# Patient Record
Sex: MALE | Race: WHITE | NOT HISPANIC OR LATINO | URBAN - METROPOLITAN AREA
[De-identification: names, ages, dates, MRNs, and addresses within clinical notes are randomized per-mention and may not be internally consistent; named-entity substitution may affect disease eponyms.]

---

## 2017-11-23 ENCOUNTER — EMERGENCY (EMERGENCY)
Facility: HOSPITAL | Age: 82
LOS: 1 days | Discharge: HOME | End: 2017-11-23
Attending: EMERGENCY MEDICINE | Admitting: EMERGENCY MEDICINE
Payer: MEDICARE

## 2017-11-23 VITALS
RESPIRATION RATE: 18 BRPM | OXYGEN SATURATION: 95 % | WEIGHT: 154.98 LBS | TEMPERATURE: 99 F | SYSTOLIC BLOOD PRESSURE: 115 MMHG | DIASTOLIC BLOOD PRESSURE: 78 MMHG | HEART RATE: 102 BPM

## 2017-11-23 DIAGNOSIS — R50.9 FEVER, UNSPECIFIED: ICD-10-CM

## 2017-11-23 DIAGNOSIS — I10 ESSENTIAL (PRIMARY) HYPERTENSION: ICD-10-CM

## 2017-11-23 PROCEDURE — 87040 BLOOD CULTURE FOR BACTERIA: CPT

## 2017-11-23 PROCEDURE — 99284 EMERGENCY DEPT VISIT MOD MDM: CPT | Mod: 25

## 2017-11-23 PROCEDURE — 87633 RESP VIRUS 12-25 TARGETS: CPT

## 2017-11-23 PROCEDURE — 71020: CPT | Mod: 26

## 2017-11-23 PROCEDURE — 71046 X-RAY EXAM CHEST 2 VIEWS: CPT

## 2017-11-23 PROCEDURE — 87798 DETECT AGENT NOS DNA AMP: CPT

## 2017-11-23 PROCEDURE — 87581 M.PNEUMON DNA AMP PROBE: CPT

## 2017-11-23 PROCEDURE — 85730 THROMBOPLASTIN TIME PARTIAL: CPT

## 2017-11-23 PROCEDURE — 83605 ASSAY OF LACTIC ACID: CPT

## 2017-11-23 PROCEDURE — 85025 COMPLETE CBC W/AUTO DIFF WBC: CPT

## 2017-11-23 PROCEDURE — 80053 COMPREHEN METABOLIC PANEL: CPT

## 2017-11-23 PROCEDURE — 36415 COLL VENOUS BLD VENIPUNCTURE: CPT

## 2017-11-23 PROCEDURE — 87486 CHLMYD PNEUM DNA AMP PROBE: CPT

## 2017-11-23 PROCEDURE — 96374 THER/PROPH/DIAG INJ IV PUSH: CPT

## 2017-11-23 PROCEDURE — 85610 PROTHROMBIN TIME: CPT

## 2017-11-23 RX ORDER — CEFTRIAXONE 500 MG/1
1 INJECTION, POWDER, FOR SOLUTION INTRAMUSCULAR; INTRAVENOUS ONCE
Qty: 0 | Refills: 0 | Status: DISCONTINUED | OUTPATIENT
Start: 2017-11-23 | End: 2017-11-23

## 2017-11-23 RX ORDER — CEFTRIAXONE 500 MG/1
1 INJECTION, POWDER, FOR SOLUTION INTRAMUSCULAR; INTRAVENOUS ONCE
Qty: 0 | Refills: 0 | Status: COMPLETED | OUTPATIENT
Start: 2017-11-23 | End: 2017-11-23

## 2017-11-23 RX ORDER — SODIUM CHLORIDE 9 MG/ML
500 INJECTION INTRAMUSCULAR; INTRAVENOUS; SUBCUTANEOUS ONCE
Qty: 0 | Refills: 0 | Status: COMPLETED | OUTPATIENT
Start: 2017-11-23 | End: 2017-11-23

## 2017-11-23 RX ADMIN — SODIUM CHLORIDE 250 MILLILITER(S): 9 INJECTION INTRAMUSCULAR; INTRAVENOUS; SUBCUTANEOUS at 23:16

## 2017-11-23 RX ADMIN — CEFTRIAXONE 100 GRAM(S): 500 INJECTION, POWDER, FOR SOLUTION INTRAMUSCULAR; INTRAVENOUS at 23:48

## 2017-11-23 NOTE — ED ADULT NURSE NOTE - OBJECTIVE STATEMENT
84 y/o male with hx of HTN arrived to [facility] ER reporting fever since this morning accompanied with slight weakness. Pt verbalized that he was experiencing chills and had an oral temperature of 100.9 F then proceeded to take Tylenol at 7pm. Upon assessment, abdomen soft, lung fields WNL, breathing is equal and unlabored, pulses palpable, no visible injuries noted. Pt denies dysuria, chest pain, headache, dizziness, blurred vision, slurred speech, nausea, vomiting, diarrhea, fever, chills, LOC, SOB, recent travel, recent injury, and palpitations. care in progress. Awaiting disposition

## 2017-11-23 NOTE — ED PROVIDER NOTE - OBJECTIVE STATEMENT
83 yom pw fever, chills, fatigue.  denies cough, no sob, no abd pain, no vomiting, no leg pain, no rash, no HA, no neck pain.  no known sick contact.  sx today.  took 2 tylenol at 7pm.

## 2017-11-23 NOTE — ED PROVIDER NOTE - DIAGNOSTIC INTERPRETATION
ER Physician: Swapnil Romero  CHEST XRAY INTERPRETATION: lungs clear, heart shadow normal, bony structures intact

## 2017-11-24 VITALS
RESPIRATION RATE: 18 BRPM | SYSTOLIC BLOOD PRESSURE: 124 MMHG | DIASTOLIC BLOOD PRESSURE: 80 MMHG | HEART RATE: 88 BPM | TEMPERATURE: 98 F | OXYGEN SATURATION: 95 %

## 2017-11-24 LAB — RAPID RVP RESULT: SIGNIFICANT CHANGE UP

## 2017-11-24 NOTE — ED ADULT NURSE REASSESSMENT NOTE - NS ED NURSE REASSESS COMMENT FT1
Pt has a steady gait walking with dr. Romero. PT respectfully declined the urine sample and getting a straight cath for urine. Pt tried to urinate with assistance and cannot pee. PT. will send urine off to his medical doctor tomorrow. Dr Romero is aware,.

## 2018-06-07 ENCOUNTER — EMERGENCY (EMERGENCY)
Facility: HOSPITAL | Age: 83
LOS: 1 days | Discharge: HOME | End: 2018-06-07
Attending: EMERGENCY MEDICINE | Admitting: EMERGENCY MEDICINE
Payer: MEDICARE

## 2018-06-07 VITALS
DIASTOLIC BLOOD PRESSURE: 80 MMHG | OXYGEN SATURATION: 96 % | TEMPERATURE: 97 F | SYSTOLIC BLOOD PRESSURE: 129 MMHG | RESPIRATION RATE: 18 BRPM | HEART RATE: 82 BPM

## 2018-06-07 LAB
APTT BLD: 27.9 SEC — SIGNIFICANT CHANGE UP (ref 27.5–37.4)
BASOPHILS NFR BLD AUTO: 0.3 % — SIGNIFICANT CHANGE UP (ref 0–2)
EOSINOPHIL NFR BLD AUTO: 1.9 % — SIGNIFICANT CHANGE UP (ref 0–6)
HCT VFR BLD CALC: 34.1 % — LOW (ref 39–50)
HGB BLD-MCNC: 11.9 G/DL — LOW (ref 13–17)
INR BLD: 1.08 — SIGNIFICANT CHANGE UP (ref 0.88–1.16)
LYMPHOCYTES # BLD AUTO: 34.1 % — SIGNIFICANT CHANGE UP (ref 13–44)
MCHC RBC-ENTMCNC: 29.7 PG — SIGNIFICANT CHANGE UP (ref 27–34)
MCHC RBC-ENTMCNC: 34.9 G/DL — SIGNIFICANT CHANGE UP (ref 32–36)
MCV RBC AUTO: 85 FL — SIGNIFICANT CHANGE UP (ref 80–100)
MONOCYTES NFR BLD AUTO: 5.9 % — SIGNIFICANT CHANGE UP (ref 2–14)
NEUTROPHILS NFR BLD AUTO: 57.8 % — SIGNIFICANT CHANGE UP (ref 43–77)
PLATELET # BLD AUTO: 187 K/UL — SIGNIFICANT CHANGE UP (ref 150–400)
PROTHROM AB SERPL-ACNC: 12 SEC — SIGNIFICANT CHANGE UP (ref 9.8–12.7)
RBC # BLD: 4.01 M/UL — LOW (ref 4.2–5.8)
RBC # FLD: 12.9 % — SIGNIFICANT CHANGE UP (ref 10.3–16.9)
WBC # BLD: 5.7 K/UL — SIGNIFICANT CHANGE UP (ref 3.8–10.5)
WBC # FLD AUTO: 5.7 K/UL — SIGNIFICANT CHANGE UP (ref 3.8–10.5)

## 2018-06-07 PROCEDURE — 99284 EMERGENCY DEPT VISIT MOD MDM: CPT

## 2018-06-07 RX ORDER — LEVETIRACETAM 250 MG/1
1000 TABLET, FILM COATED ORAL ONCE
Qty: 0 | Refills: 0 | Status: COMPLETED | OUTPATIENT
Start: 2018-06-07 | End: 2018-06-07

## 2018-06-07 RX ADMIN — LEVETIRACETAM 400 MILLIGRAM(S): 250 TABLET, FILM COATED ORAL at 23:41

## 2018-06-07 NOTE — ED ADULT NURSE NOTE - OBJECTIVE STATEMENT
pt BIBA , pt has a history of seizures did not  take his 10 pm seizure medications , pt was at a restaurant and started having seizure  like activity, no LOC , no injuries noted , Iv was accessed lab sent will continue to monitor

## 2018-06-07 NOTE — ED ADULT TRIAGE NOTE - ARRIVAL INFO ADDITIONAL COMMENTS
pt had a witnessed seizure at a restaurant with family.  arrives with amnesia to event.  no urinary inc.  oriented but slow.

## 2018-06-08 ENCOUNTER — INPATIENT (INPATIENT)
Facility: HOSPITAL | Age: 83
LOS: 4 days | Discharge: NURSING, REHAB OR LONG-TERM CARE | DRG: 64 | End: 2018-06-13
Attending: NEUROLOGICAL SURGERY | Admitting: NEUROLOGICAL SURGERY
Payer: MEDICARE

## 2018-06-08 VITALS
SYSTOLIC BLOOD PRESSURE: 133 MMHG | RESPIRATION RATE: 18 BRPM | DIASTOLIC BLOOD PRESSURE: 71 MMHG | HEART RATE: 97 BPM | OXYGEN SATURATION: 95 %

## 2018-06-08 VITALS
OXYGEN SATURATION: 98 % | RESPIRATION RATE: 18 BRPM | HEART RATE: 83 BPM | DIASTOLIC BLOOD PRESSURE: 88 MMHG | SYSTOLIC BLOOD PRESSURE: 146 MMHG

## 2018-06-08 DIAGNOSIS — I62.9 NONTRAUMATIC INTRACRANIAL HEMORRHAGE, UNSPECIFIED: ICD-10-CM

## 2018-06-08 PROBLEM — I10 ESSENTIAL (PRIMARY) HYPERTENSION: Chronic | Status: ACTIVE | Noted: 2017-11-23

## 2018-06-08 LAB
ALBUMIN SERPL ELPH-MCNC: 4.2 G/DL — SIGNIFICANT CHANGE UP (ref 3.3–5)
ALP SERPL-CCNC: 90 U/L — SIGNIFICANT CHANGE UP (ref 40–120)
ALT FLD-CCNC: 26 U/L — SIGNIFICANT CHANGE UP (ref 10–45)
ANION GAP SERPL CALC-SCNC: 14 MMOL/L — SIGNIFICANT CHANGE UP (ref 5–17)
APPEARANCE UR: CLEAR — SIGNIFICANT CHANGE UP
AST SERPL-CCNC: 29 U/L — SIGNIFICANT CHANGE UP (ref 10–40)
BILIRUB SERPL-MCNC: 0.4 MG/DL — SIGNIFICANT CHANGE UP (ref 0.2–1.2)
BILIRUB UR-MCNC: NEGATIVE — SIGNIFICANT CHANGE UP
BLD GP AB SCN SERPL QL: NEGATIVE — SIGNIFICANT CHANGE UP
BLD GP AB SCN SERPL QL: NEGATIVE — SIGNIFICANT CHANGE UP
BUN SERPL-MCNC: 17 MG/DL — SIGNIFICANT CHANGE UP (ref 7–23)
CALCIUM SERPL-MCNC: 9.1 MG/DL — SIGNIFICANT CHANGE UP (ref 8.4–10.5)
CHLORIDE SERPL-SCNC: 100 MMOL/L — SIGNIFICANT CHANGE UP (ref 96–108)
CO2 SERPL-SCNC: 23 MMOL/L — SIGNIFICANT CHANGE UP (ref 22–31)
COLOR SPEC: YELLOW — SIGNIFICANT CHANGE UP
CREAT SERPL-MCNC: 0.84 MG/DL — SIGNIFICANT CHANGE UP (ref 0.5–1.3)
DIFF PNL FLD: NEGATIVE — SIGNIFICANT CHANGE UP
GLUCOSE SERPL-MCNC: 109 MG/DL — HIGH (ref 70–99)
GLUCOSE UR QL: NEGATIVE — SIGNIFICANT CHANGE UP
KETONES UR-MCNC: NEGATIVE — SIGNIFICANT CHANGE UP
LEUKOCYTE ESTERASE UR-ACNC: NEGATIVE — SIGNIFICANT CHANGE UP
NITRITE UR-MCNC: NEGATIVE — SIGNIFICANT CHANGE UP
PCP SPEC-MCNC: SIGNIFICANT CHANGE UP
PH UR: 6 — SIGNIFICANT CHANGE UP (ref 5–8)
POTASSIUM SERPL-MCNC: 4.4 MMOL/L — SIGNIFICANT CHANGE UP (ref 3.5–5.3)
POTASSIUM SERPL-SCNC: 4.4 MMOL/L — SIGNIFICANT CHANGE UP (ref 3.5–5.3)
PROT SERPL-MCNC: 6.6 G/DL — SIGNIFICANT CHANGE UP (ref 6–8.3)
PROT UR-MCNC: NEGATIVE MG/DL — SIGNIFICANT CHANGE UP
RH IG SCN BLD-IMP: POSITIVE — SIGNIFICANT CHANGE UP
RH IG SCN BLD-IMP: POSITIVE — SIGNIFICANT CHANGE UP
SODIUM SERPL-SCNC: 137 MMOL/L — SIGNIFICANT CHANGE UP (ref 135–145)
SP GR SPEC: 1.01 — SIGNIFICANT CHANGE UP (ref 1–1.03)
UROBILINOGEN FLD QL: 0.2 E.U./DL — SIGNIFICANT CHANGE UP

## 2018-06-08 PROCEDURE — 72100 X-RAY EXAM L-S SPINE 2/3 VWS: CPT | Mod: 26

## 2018-06-08 PROCEDURE — 93306 TTE W/DOPPLER COMPLETE: CPT | Mod: 26

## 2018-06-08 PROCEDURE — 74177 CT ABD & PELVIS W/CONTRAST: CPT | Mod: 26

## 2018-06-08 PROCEDURE — 99291 CRITICAL CARE FIRST HOUR: CPT

## 2018-06-08 PROCEDURE — 70553 MRI BRAIN STEM W/O & W/DYE: CPT | Mod: 26

## 2018-06-08 PROCEDURE — 70496 CT ANGIOGRAPHY HEAD: CPT | Mod: 26

## 2018-06-08 PROCEDURE — 93010 ELECTROCARDIOGRAM REPORT: CPT

## 2018-06-08 PROCEDURE — 71260 CT THORAX DX C+: CPT | Mod: 26

## 2018-06-08 PROCEDURE — 70450 CT HEAD/BRAIN W/O DYE: CPT | Mod: 26,59

## 2018-06-08 PROCEDURE — 95951: CPT | Mod: 26,52

## 2018-06-08 PROCEDURE — 71045 X-RAY EXAM CHEST 1 VIEW: CPT | Mod: 26

## 2018-06-08 PROCEDURE — 99223 1ST HOSP IP/OBS HIGH 75: CPT

## 2018-06-08 RX ORDER — SODIUM CHLORIDE 9 MG/ML
1000 INJECTION INTRAMUSCULAR; INTRAVENOUS; SUBCUTANEOUS
Qty: 0 | Refills: 0 | Status: DISCONTINUED | OUTPATIENT
Start: 2018-06-08 | End: 2018-06-09

## 2018-06-08 RX ORDER — DESMOPRESSIN ACETATE 0.1 MG/1
20 TABLET ORAL ONCE
Qty: 0 | Refills: 0 | Status: COMPLETED | OUTPATIENT
Start: 2018-06-08 | End: 2018-06-08

## 2018-06-08 RX ORDER — DIATRIZOATE MEGLUMINE 180 MG/ML
30 INJECTION, SOLUTION INTRAVESICAL ONCE
Qty: 0 | Refills: 0 | Status: COMPLETED | OUTPATIENT
Start: 2018-06-08 | End: 2018-06-08

## 2018-06-08 RX ORDER — DOCUSATE SODIUM 100 MG
100 CAPSULE ORAL THREE TIMES A DAY
Qty: 0 | Refills: 0 | Status: DISCONTINUED | OUTPATIENT
Start: 2018-06-08 | End: 2018-06-13

## 2018-06-08 RX ORDER — LEVETIRACETAM 250 MG/1
1000 TABLET, FILM COATED ORAL
Qty: 0 | Refills: 0 | Status: DISCONTINUED | OUTPATIENT
Start: 2018-06-09 | End: 2018-06-13

## 2018-06-08 RX ORDER — LISINOPRIL 2.5 MG/1
20 TABLET ORAL DAILY
Qty: 0 | Refills: 0 | Status: DISCONTINUED | OUTPATIENT
Start: 2018-06-08 | End: 2018-06-13

## 2018-06-08 RX ORDER — ACETAMINOPHEN 500 MG
650 TABLET ORAL ONCE
Qty: 0 | Refills: 0 | Status: COMPLETED | OUTPATIENT
Start: 2018-06-08 | End: 2018-06-08

## 2018-06-08 RX ORDER — TAMSULOSIN HYDROCHLORIDE 0.4 MG/1
0.4 CAPSULE ORAL AT BEDTIME
Qty: 0 | Refills: 0 | Status: DISCONTINUED | OUTPATIENT
Start: 2018-06-08 | End: 2018-06-13

## 2018-06-08 RX ORDER — OXYCODONE AND ACETAMINOPHEN 5; 325 MG/1; MG/1
1 TABLET ORAL EVERY 4 HOURS
Qty: 0 | Refills: 0 | Status: DISCONTINUED | OUTPATIENT
Start: 2018-06-08 | End: 2018-06-09

## 2018-06-08 RX ORDER — PANTOPRAZOLE SODIUM 20 MG/1
40 TABLET, DELAYED RELEASE ORAL DAILY
Qty: 0 | Refills: 0 | Status: DISCONTINUED | OUTPATIENT
Start: 2018-06-08 | End: 2018-06-10

## 2018-06-08 RX ORDER — LUTEIN 20 MG
1 CAPSULE ORAL
Qty: 0 | Refills: 0 | COMMUNITY

## 2018-06-08 RX ORDER — LEVETIRACETAM 250 MG/1
1000 TABLET, FILM COATED ORAL EVERY 12 HOURS
Qty: 0 | Refills: 0 | Status: DISCONTINUED | OUTPATIENT
Start: 2018-06-08 | End: 2018-06-08

## 2018-06-08 RX ORDER — SODIUM CHLORIDE 9 MG/ML
1000 INJECTION INTRAMUSCULAR; INTRAVENOUS; SUBCUTANEOUS
Qty: 0 | Refills: 0 | Status: DISCONTINUED | OUTPATIENT
Start: 2018-06-08 | End: 2018-06-08

## 2018-06-08 RX ORDER — GLUCOSAMINE HCL/CHONDROITIN SU 500-400 MG
0 CAPSULE ORAL
Qty: 0 | Refills: 0 | COMMUNITY

## 2018-06-08 RX ORDER — LEVETIRACETAM 250 MG/1
1500 TABLET, FILM COATED ORAL
Qty: 0 | Refills: 0 | Status: DISCONTINUED | OUTPATIENT
Start: 2018-06-08 | End: 2018-06-13

## 2018-06-08 RX ORDER — ONDANSETRON 8 MG/1
4 TABLET, FILM COATED ORAL EVERY 6 HOURS
Qty: 0 | Refills: 0 | Status: DISCONTINUED | OUTPATIENT
Start: 2018-06-08 | End: 2018-06-13

## 2018-06-08 RX ORDER — OXYCODONE AND ACETAMINOPHEN 5; 325 MG/1; MG/1
2 TABLET ORAL EVERY 4 HOURS
Qty: 0 | Refills: 0 | Status: DISCONTINUED | OUTPATIENT
Start: 2018-06-08 | End: 2018-06-09

## 2018-06-08 RX ORDER — ACETAMINOPHEN 500 MG
1000 TABLET ORAL ONCE
Qty: 0 | Refills: 0 | Status: COMPLETED | OUTPATIENT
Start: 2018-06-08 | End: 2018-06-08

## 2018-06-08 RX ORDER — SIMVASTATIN 20 MG/1
20 TABLET, FILM COATED ORAL AT BEDTIME
Qty: 0 | Refills: 0 | Status: DISCONTINUED | OUTPATIENT
Start: 2018-06-08 | End: 2018-06-12

## 2018-06-08 RX ORDER — ACETAMINOPHEN 500 MG
650 TABLET ORAL EVERY 6 HOURS
Qty: 0 | Refills: 0 | Status: DISCONTINUED | OUTPATIENT
Start: 2018-06-08 | End: 2018-06-09

## 2018-06-08 RX ORDER — SENNA PLUS 8.6 MG/1
2 TABLET ORAL AT BEDTIME
Qty: 0 | Refills: 0 | Status: DISCONTINUED | OUTPATIENT
Start: 2018-06-08 | End: 2018-06-13

## 2018-06-08 RX ORDER — LEVETIRACETAM 250 MG/1
1000 TABLET, FILM COATED ORAL ONCE
Qty: 0 | Refills: 0 | Status: COMPLETED | OUTPATIENT
Start: 2018-06-08 | End: 2018-06-08

## 2018-06-08 RX ADMIN — SODIUM CHLORIDE 75 MILLILITER(S): 9 INJECTION INTRAMUSCULAR; INTRAVENOUS; SUBCUTANEOUS at 06:55

## 2018-06-08 RX ADMIN — Medication 650 MILLIGRAM(S): at 10:29

## 2018-06-08 RX ADMIN — LEVETIRACETAM 1500 MILLIGRAM(S): 250 TABLET, FILM COATED ORAL at 18:07

## 2018-06-08 RX ADMIN — OXYCODONE AND ACETAMINOPHEN 1 TABLET(S): 5; 325 TABLET ORAL at 18:36

## 2018-06-08 RX ADMIN — Medication 1000 MILLIGRAM(S): at 12:44

## 2018-06-08 RX ADMIN — DIATRIZOATE MEGLUMINE 30 MILLILITER(S): 180 INJECTION, SOLUTION INTRAVESICAL at 11:54

## 2018-06-08 RX ADMIN — SIMVASTATIN 20 MILLIGRAM(S): 20 TABLET, FILM COATED ORAL at 21:34

## 2018-06-08 RX ADMIN — Medication 650 MILLIGRAM(S): at 09:59

## 2018-06-08 RX ADMIN — Medication 400 MILLIGRAM(S): at 12:14

## 2018-06-08 RX ADMIN — Medication 100 MILLIGRAM(S): at 14:53

## 2018-06-08 RX ADMIN — Medication 1 TABLET(S): at 14:53

## 2018-06-08 RX ADMIN — DESMOPRESSIN ACETATE 220 MICROGRAM(S): 0.1 TABLET ORAL at 04:39

## 2018-06-08 RX ADMIN — Medication 650 MILLIGRAM(S): at 02:34

## 2018-06-08 RX ADMIN — OXYCODONE AND ACETAMINOPHEN 1 TABLET(S): 5; 325 TABLET ORAL at 22:54

## 2018-06-08 RX ADMIN — OXYCODONE AND ACETAMINOPHEN 1 TABLET(S): 5; 325 TABLET ORAL at 22:02

## 2018-06-08 RX ADMIN — LISINOPRIL 20 MILLIGRAM(S): 2.5 TABLET ORAL at 06:55

## 2018-06-08 RX ADMIN — PANTOPRAZOLE SODIUM 40 MILLIGRAM(S): 20 TABLET, DELAYED RELEASE ORAL at 14:52

## 2018-06-08 RX ADMIN — SODIUM CHLORIDE 50 MILLILITER(S): 9 INJECTION INTRAMUSCULAR; INTRAVENOUS; SUBCUTANEOUS at 21:34

## 2018-06-08 RX ADMIN — TAMSULOSIN HYDROCHLORIDE 0.4 MILLIGRAM(S): 0.4 CAPSULE ORAL at 21:34

## 2018-06-08 RX ADMIN — LEVETIRACETAM 400 MILLIGRAM(S): 250 TABLET, FILM COATED ORAL at 06:56

## 2018-06-08 RX ADMIN — Medication 100 MILLIGRAM(S): at 06:55

## 2018-06-08 RX ADMIN — Medication 100 MILLIGRAM(S): at 21:34

## 2018-06-08 RX ADMIN — OXYCODONE AND ACETAMINOPHEN 1 TABLET(S): 5; 325 TABLET ORAL at 18:06

## 2018-06-08 NOTE — ED PROVIDER NOTE - PROGRESS NOTE DETAILS
+intracranial hemorrhage, no significant mass effect.  neurosurgery consulted NSG - recommend 1U platelets transfusion and DDAVP as pt is on aspirin.  recommend CT angio.  will admit to their service

## 2018-06-08 NOTE — H&P ADULT - PROBLEM SELECTOR PLAN 1
- Admit to neurosurgery ICU bed   - CTA head  - Repeat CTH 6 hours after 1st CT (8am)   - DDAVP 20mcg to reverse ASA 81   - 1 unit platelets   - BP    - Cont. Keppra - 1g in am, 1500mg in pm   - EEG after CTA   - Inc HOB 30 deg   - D/w Dr. Perrin

## 2018-06-08 NOTE — ED ADULT NURSE NOTE - CHPI ED SYMPTOMS NEG
no blurred vision/no vomiting/no loss of consciousness/no nausea/no numbness/no confusion/no weakness/no fever

## 2018-06-08 NOTE — ED PROVIDER NOTE - PROGRESS NOTE DETAILS
feeling well, acting like himself per family, staedy gait.  spoke with Dr. Watson.  recommend PMD and neuro f/u.   I have discussed the discharge plan with the patient. The patient agrees with the plan, as discussed.  The patient understands Emergency Department diagnosis is a preliminary diagnosis often based on limited information and that the patient must adhere to the follow-up plan as discussed.  The patient understands that if the symptoms worsen  the patient may return to the Emergency Department at any time for further evaluation and treatment.

## 2018-06-08 NOTE — ED PROVIDER NOTE - MEDICAL DECISION MAKING DETAILS
s/p second seizure tonight, compliant with medication, no focal neuro deficits  -check ct head, call epilepsy service

## 2018-06-08 NOTE — ED ADULT NURSE NOTE - OBJECTIVE STATEMENT
The patient is a 85 y/o male who came in to ED for evaluation s/p seizure today, after being discharged from  ED. As per wife pt has never have two seizures in one day. Pt was here in ED earlier for seizure episode and was medicated with Keppra 1g and then discharged. Pt urinated on himself.

## 2018-06-08 NOTE — PROGRESS NOTE ADULT - ASSESSMENT
Hypertension well controlled  Hemorrhagic stroke with subsequent seizure  Back pain r/o compression Fx    Plan neuro management per Alanis Perrin and Cam.  Would get xray or CT spine to r/o fx.

## 2018-06-08 NOTE — PROGRESS NOTE ADULT - ASSESSMENT
85 yo M w/ known h/o of L frontal cavernous malformation presents w/ seizures while on Keppra.  Now w/ backpain--may be from prostate CA vs injury from seizures.     transfer to SDU  d/w Dr. Levy and nsg   work up back pain  no need for urgency of CT chest/abd/pelvis w/ contrast--recommend cancelling  PT/OT eval  cont Keppra home dose  neuro checks q 4

## 2018-06-08 NOTE — H&P ADULT - NSHPLABSRESULTS_GEN_ALL_CORE
EXAM:  CT BRAIN                        PROCEDURE DATE:  06/08/2018    IMPRESSION:   Intraparenchymal hemorrhage within the left frontal subcortical white   matter measures up to approximately 2 cm. No midline shift or significant   mass effect. Underlying neoplasm is not excluded. Follow up with contrast   enhanced MRI is advised.

## 2018-06-08 NOTE — ED PROVIDER NOTE - OBJECTIVE STATEMENT
84M hx htn, seizures, brought back to ED after having seizure in cab ride home.  family states again whole body stiffened.  was shorter than last one.  pt was in ED earlier tonight after seizure during dinner.  given keppra 1000mg.  pt states he feels ok.  no numbness/weakness. no HA. no tongue biting.  no vomiting. 84M hx htn, seizures, brought back to ED after having seizure in cab ride home.  family states again whole body stiffened.  was shorter than last one.  pt was in ED earlier tonight after seizure during dinner.  given keppra 1000mg.  pt states he feels ok.  no numbness/weakness. no HA. no tongue biting.  no vomiting.  +incontinence.

## 2018-06-08 NOTE — ED PROVIDER NOTE - OBJECTIVE STATEMENT
84M hx seizures, htn, s/p seizure at dinner tonight.  per family pt was at dinner, suddenly became stiff and had shaking to body and limbs.  no fall, no head trauma.  states lasted 2-3 min.  no tongue biting, no incontinence.  was initially a little confused, however now feeling better.  states has had seizures in past after stroke, did not take evening keppra dose today.  no HA. no chest pain, no numbness/weakness.  no slurred speech.

## 2018-06-08 NOTE — H&P ADULT - NSHPPHYSICALEXAM_GEN_ALL_CORE
AOx3, NAD, calm, but slower and more fatigued than baseline per family   Face symmetric   PERRL, EOMI, visual fields intact   CN II-XII intact   Tongue midline   No pronator drift   MAEx4, strength 5/5 throughout  SILT   Unlabored breathing on RA   Reg cardiac rate   Abd soft, NT, ND

## 2018-06-08 NOTE — PROGRESS NOTE ADULT - SUBJECTIVE AND OBJECTIVE BOX
83 yo man with a history of hypertension for many years, prostate ca diagnosed in 2007,  and recurrent hemorrhagic strokes admitted after recurrence of seizures previously controlled on Keppra, and MRI evidence of new and old hemorrhage in left frontal lobe.  Pt reports he now feels well though is tired and has pain in his back which was likely hit when he fell in the midst of his recent seizure.  PE /70 mmHg  No fever Chest clear  Localized tenderness over spine

## 2018-06-08 NOTE — ED PROVIDER NOTE - MEDICAL DECISION MAKING DETAILS
s/p seizure tonight, no focal neuro deficits, no slurred speech, no facial asymmetry.  family and pt do not wish to have CT head at this time.  no head trauma.  -check labs, keppra s/p seizure tonight, no focal neuro deficits, no slurred speech, no facial asymmetry. pt aox3, at baseline per wife and son.  had discussion with pt and family, they do not wish to have CT head at this time though offered.  no head trauma.  -check labs, keppra

## 2018-06-08 NOTE — ED PROVIDER NOTE - CRITICAL CARE PROVIDED
documentation/consult w/ pt's family directly relating to pts condition/interpretation of diagnostic studies/direct patient care (not related to procedure)/consultation with other physicians/additional history taking

## 2018-06-08 NOTE — H&P ADULT - ATTENDING COMMENTS
Patient seen and examined by me. He presents with a small left frontal ICH. He has a history of multiple hemorrhage events in the left frontal region over the years. He has epilepsy generally presenting with speech arrest and confusion suggestive of focal onset in the area of brain hemorrhage. He has previously been worked up in [place] and by Dr. Levy for his epilepsy and recurrent brain hemorrhages. Per conversation with Dr. Levy, he likely has an underlying cavernous malformation. Unfortunately, no brain MRI is available to show such a lesion aside from ones obtained during acute hemorrhage events. MRI from Nov 2017 showed a similar "left frontal peripheral intra-axial hemorrhagic mass". EEG monitoring was placed on this admission and no seizures identified.     The possibility of underlying cavernous malformation discussed with family. Given the multiple symptomatic hemorrhage events and his medically intractable seizures, resection of the hemorrhagic mass offered as a reasonable strategy to both effect seizure control and to reduce the likelihood of repeat hemorrhage. The patient and primality his wife are not interested in pursuing operative intervention at this time. Patient would prefer conservative measures including AED adjustment if indicated. I will defer to neurologist Dr. Levy to adjust AEDs as he sees fit.    Of note, patient also presents with back pain and was found to have multiple areas of compression fracture in the thoracic and lumbar spine. Suspect osteoporotic in nature but will order MRI T- and L-spine as pathological fracture is in the differential diagnosis. The fractures are otherwise not destabilizing and can be managed conservatively pending MRI findings.    Will plan for followup brain and spine (thoracic and lumbar) in 1 to 2 months as an outpatient. Disposition plan home versus rehab pending PT/OT assessment.    Jeff Perrin M.D.  Neurosurgery

## 2018-06-08 NOTE — H&P ADULT - ASSESSMENT
84M hx stroke in 2013 with residual seizures, HTN, BPH presents with seizures x2, found to have left frontal IPH.

## 2018-06-08 NOTE — CONSULT NOTE ADULT - SUBJECTIVE AND OBJECTIVE BOX
CC: He had 2 more seizures  HPI: 84 M had "stroke" in 2012 present with aphasia and right limb weakness. L frontal ICH resolved without underlying neoplasm as per verbal report from family of follow-up in [place]. Seizure prompted start of Keppra that was subsequently increased when he had another seizure in 2013. He was stable on 1000mg qam, 1500qpm  when he had an episode of fever and confusion in November 2017 without specific metabolic cause in [facility] ER. On 11/28/17 L frontal ICH demonstrated on MRI while neurologically intact. At dinner last pm he became stiff and unresponsive in his seat and was brought to ER where seizure was explained away as missed Keppra dose. He then had another seizure in cab after leaving ER and returned. CT showed L frontal ICH with negative CTA.  ROS: thoraco-lumbar back pain since admission that increases on rolling over in bed  PE: alert, oriented, VFF, facial movements symmetric, minimal RUE pronator drift without weakness or slowing of JEMIMA, R extensor plantar response, sensation L=R, without dysmetria. Tender T12/L1?    A/P  1.Most likely cavernous malformation with seizures provoked by recurrent bleed.  2.Compression fracture due to forceful exension during tonic phase of seizure.     Discussed with NS team and with IM

## 2018-06-08 NOTE — PHYSICAL THERAPY INITIAL EVALUATION ADULT - PERTINENT HX OF CURRENT PROBLEM, REHAB EVAL
84 year old male had a witnessed seizure in the cab, was brought to ER for evaluation and keppra, no CTH performed at this time, then had repeat seizure episode after being discharged from ER. Patient was found to have left frontal IPH.

## 2018-06-08 NOTE — ED ADULT TRIAGE NOTE - ARRIVAL INFO ADDITIONAL COMMENTS
pt was seen in ER for seizure and discharged 30 mins ago.  while pt was getting in cab he had another seizure.  this time pt was inc of urine.

## 2018-06-08 NOTE — H&P ADULT - HISTORY OF PRESENT ILLNESS
84M hx HTN, stroke in 2013 with residual seizures, on ASA 81mg QD presents with witnessed seizures x2 this evening prior to taking pm dose of keppra, CTH reveals left frontal IPH, no MLS. Pt remembers both episodes, denies LOC, tongue biting, but did experience incontinence. He reports mild left sided back pain currently, but no headache, blurry vision, dizziness, weakness or numbness. Family reports "whole body stiffened" and body/limbs were shaking. He is currently being followed by a neurologist in [place] who changed his keppra dose, but also has seen Dr. Levy in [facility]. 84M hx HTN, BPH, stroke in 2013 with residual seizures, on ASA 81mg QD presents with witnessed seizures x2, CTH reveals left frontal IPH, no MLS. Per family, pt was on route home from dinner and had not yet taken his 10pm keppra. He had a witnessed seizure in the cab, was brought to ER for evaluation and keppra, no CTH performed at this time, then had repeat seizure episode after being discharged from ER. Pt remembers both episodes, denies LOC, tongue biting, but did experience incontinence. He reports mild left sided back pain currently, but no headache, blurry vision, dizziness, weakness or numbness. Family reports "whole body stiffened" and body/limbs were shaking. He is currently being followed by a neurologist in [place] who changed his keppra dose, but also has seen Dr. Levy in [facility]. ICH score 1, NIH score 0.

## 2018-06-08 NOTE — PROGRESS NOTE ADULT - SUBJECTIVE AND OBJECTIVE BOX
NEUROCRITICAL CARE PROGRESS NOTE    THOMAS ROBB   MRN-[id number]  Summary:  /  HPI:  84M hx HTN, BPH, stroke in 2013 with residual seizures, on ASA 81mg QD presents with witnessed seizures x2, CTH reveals left frontal IPH, no MLS. Per family, pt was on route home from dinner and had not yet taken his 10pm keppra. He had a witnessed seizure in the cab, was brought to ER for evaluation and keppra, no CTH performed at this time, then had repeat seizure episode after being discharged from ER. Pt remembers both episodes, denies LOC, tongue biting, but did experience incontinence. He reports mild left sided back pain currently, but no headache, blurry vision, dizziness, weakness or numbness. Family reports "whole body stiffened" and body/limbs were shaking. He is currently being followed by a neurologist in [place] who changed his keppra dose, but also has seen Dr. Levy in [facility]. ICH score 1, NIH score 0. (08 Jun 2018 04:27)      S/Overnight events:    POD#     EVD:    CSF :    ICPs:      Vital Signs Last 24 Hrs  T(C): 37.7 (08 Jun 2018 07:17), Max: 37.7 (08 Jun 2018 07:17)  T(F): 99.8 (08 Jun 2018 07:17), Max: 99.8 (08 Jun 2018 07:17)  HR: 86 (08 Jun 2018 06:02) (82 - 97)  BP: 132/73 (08 Jun 2018 06:02) (129/80 - 146/88)  BP(mean): 94 (08 Jun 2018 06:02) (93 - 94)  RR: 16 (08 Jun 2018 06:02) (16 - 20)  SpO2: 96% (08 Jun 2018 06:02) (94% - 98%)        I&O's Detail    07 Jun 2018 07:01  -  08 Jun 2018 07:00  --------------------------------------------------------  IN:    Platelets - Single Donor: 221 mL  Total IN: 221 mL    OUT:  Total OUT: 0 mL    Total NET: 221 mL      LABS:                        11.9   5.7   )-----------( 187      ( 07 Jun 2018 23:34 )             34.1     06-07    137  |  100  |  17  ----------------------------<  109<H>  4.4   |  23  |  0.84    Ca    9.1      07 Jun 2018 23:34    TPro  6.6  /  Alb  4.2  /  TBili  0.4  /  DBili  x   /  AST  29  /  ALT  26  /  AlkPhos  90  06-07    PT/INR - ( 07 Jun 2018 23:34 )   PT: 12.0 sec;   INR: 1.08          PTT - ( 07 Jun 2018 23:34 )  PTT:27.9 sec        CAPILLARY BLOOD GLUCOSE      POCT Blood Glucose.: 115 mg/dL (08 Jun 2018 01:19)      Drug Levels: [] N/A    CSF Analysis: [] N/A      Allergies    No Known Allergies    Intolerances      MEDICATIONS:  Antibiotics:    Neuro:  acetaminophen   Tablet 650 milliGRAM(s) Oral every 6 hours PRN  acetaminophen   Tablet. 650 milliGRAM(s) Oral every 6 hours PRN  levETIRAcetam 1500 milliGRAM(s) Oral <User Schedule>  ondansetron Injectable 4 milliGRAM(s) IV Push every 6 hours PRN    Anticoagulation:    OTHER:  docusate sodium 100 milliGRAM(s) Oral three times a day  lisinopril 20 milliGRAM(s) Oral daily  pantoprazole  Injectable 40 milliGRAM(s) IV Push daily  senna 2 Tablet(s) Oral at bedtime PRN  simvastatin 20 milliGRAM(s) Oral at bedtime  tamsulosin 0.4 milliGRAM(s) Oral at bedtime    IVF:  multivitamin 1 Tablet(s) Oral daily  sodium chloride 0.9%. 1000 milliLiter(s) IV Continuous <Continuous>    CULTURES:    RADIOLOGY & ADDITIONAL TESTS: NEUROCRITICAL CARE PROGRESS NOTE    THOMAS ROBB   MRN-[id number]  Summary:  /  HPI:  84M hx HTN, BPH, stroke in 2013 with residual seizures, on ASA 81mg QD presents with witnessed seizures x2, CTH reveals left frontal IPH, no MLS. Per family, pt was on route home from dinner and had not yet taken his 10pm keppra. He had a witnessed seizure in the cab, was brought to ER for evaluation and keppra, no CTH performed at this time, then had repeat seizure episode after being discharged from ER. Pt remembers both episodes, denies LOC, tongue biting, but did experience incontinence. He reports mild left sided back pain currently, but no headache, blurry vision, dizziness, weakness or numbness. Family reports "whole body stiffened" and body/limbs were shaking. He is currently being followed by a neurologist in [place] who changed his keppra dose, but also has seen Dr. Levy in [facility]. ICH score 1, NIH score 0. (08 Jun 2018 04:27)    PMH: per Dr. Levy:  L frontal cavernous malformation    Vital Signs Last 24 Hrs  T(C): 37.7 (08 Jun 2018 07:17), Max: 37.7 (08 Jun 2018 07:17)  T(F): 99.8 (08 Jun 2018 07:17), Max: 99.8 (08 Jun 2018 07:17)  HR: 86 (08 Jun 2018 06:02) (82 - 97)  BP: 132/73 (08 Jun 2018 06:02) (129/80 - 146/88)  BP(mean): 94 (08 Jun 2018 06:02) (93 - 94)  RR: 16 (08 Jun 2018 06:02) (16 - 20)  SpO2: 96% (08 Jun 2018 06:02) (94% - 98%)    I&O's Detail    07 Jun 2018 07:01  -  08 Jun 2018 07:00  --------------------------------------------------------  IN:    Platelets - Single Donor: 221 mL  Total IN: 221 mL    OUT:  Total OUT: 0 mL    Total NET: 221 mL      LABS:                        11.9   5.7   )-----------( 187      ( 07 Jun 2018 23:34 )             34.1     06-07    137  |  100  |  17  ----------------------------<  109<H>  4.4   |  23  |  0.84    Ca    9.1      07 Jun 2018 23:34    TPro  6.6  /  Alb  4.2  /  TBili  0.4  /  DBili  x   /  AST  29  /  ALT  26  /  AlkPhos  90  06-07    PT/INR - ( 07 Jun 2018 23:34 )   PT: 12.0 sec;   INR: 1.08          PTT - ( 07 Jun 2018 23:34 )  PTT:27.9 sec        CAPILLARY BLOOD GLUCOSE      POCT Blood Glucose.: 115 mg/dL (08 Jun 2018 01:19)      Drug Levels: [] N/A    CSF Analysis: [] N/A      Allergies    No Known Allergies    Intolerances      MEDICATIONS:  Antibiotics:    Neuro:  acetaminophen   Tablet 650 milliGRAM(s) Oral every 6 hours PRN  acetaminophen   Tablet. 650 milliGRAM(s) Oral every 6 hours PRN  levETIRAcetam 1500 milliGRAM(s) Oral <User Schedule>  ondansetron Injectable 4 milliGRAM(s) IV Push every 6 hours PRN    Anticoagulation:    OTHER:  docusate sodium 100 milliGRAM(s) Oral three times a day  lisinopril 20 milliGRAM(s) Oral daily  pantoprazole  Injectable 40 milliGRAM(s) IV Push daily  senna 2 Tablet(s) Oral at bedtime PRN  simvastatin 20 milliGRAM(s) Oral at bedtime  tamsulosin 0.4 milliGRAM(s) Oral at bedtime    IVF:  multivitamin 1 Tablet(s) Oral daily  sodium chloride 0.9%. 1000 milliLiter(s) IV Continuous <Continuous>    CULTURES:    RADIOLOGY & ADDITIONAL TESTS:

## 2018-06-09 DIAGNOSIS — R56.9 UNSPECIFIED CONVULSIONS: ICD-10-CM

## 2018-06-09 DIAGNOSIS — I10 ESSENTIAL (PRIMARY) HYPERTENSION: ICD-10-CM

## 2018-06-09 DIAGNOSIS — I27.20 PULMONARY HYPERTENSION, UNSPECIFIED: ICD-10-CM

## 2018-06-09 DIAGNOSIS — I63.9 CEREBRAL INFARCTION, UNSPECIFIED: ICD-10-CM

## 2018-06-09 DIAGNOSIS — R91.1 SOLITARY PULMONARY NODULE: ICD-10-CM

## 2018-06-09 DIAGNOSIS — Z01.818 ENCOUNTER FOR OTHER PREPROCEDURAL EXAMINATION: ICD-10-CM

## 2018-06-09 DIAGNOSIS — Q21.1 ATRIAL SEPTAL DEFECT: ICD-10-CM

## 2018-06-09 DIAGNOSIS — J47.9 BRONCHIECTASIS, UNCOMPLICATED: ICD-10-CM

## 2018-06-09 DIAGNOSIS — I35.0 NONRHEUMATIC AORTIC (VALVE) STENOSIS: ICD-10-CM

## 2018-06-09 LAB
ANION GAP SERPL CALC-SCNC: 12 MMOL/L — SIGNIFICANT CHANGE UP (ref 5–17)
ANION GAP SERPL CALC-SCNC: 13 MMOL/L — SIGNIFICANT CHANGE UP (ref 5–17)
BUN SERPL-MCNC: 13 MG/DL — SIGNIFICANT CHANGE UP (ref 7–23)
BUN SERPL-MCNC: 13 MG/DL — SIGNIFICANT CHANGE UP (ref 7–23)
CALCIUM SERPL-MCNC: 8.5 MG/DL — SIGNIFICANT CHANGE UP (ref 8.4–10.5)
CALCIUM SERPL-MCNC: 8.6 MG/DL — SIGNIFICANT CHANGE UP (ref 8.4–10.5)
CHLORIDE SERPL-SCNC: 94 MMOL/L — LOW (ref 96–108)
CHLORIDE SERPL-SCNC: 95 MMOL/L — LOW (ref 96–108)
CO2 SERPL-SCNC: 21 MMOL/L — LOW (ref 22–31)
CO2 SERPL-SCNC: 24 MMOL/L — SIGNIFICANT CHANGE UP (ref 22–31)
CREAT SERPL-MCNC: 0.71 MG/DL — SIGNIFICANT CHANGE UP (ref 0.5–1.3)
CREAT SERPL-MCNC: 0.81 MG/DL — SIGNIFICANT CHANGE UP (ref 0.5–1.3)
GLUCOSE SERPL-MCNC: 111 MG/DL — HIGH (ref 70–99)
GLUCOSE SERPL-MCNC: 125 MG/DL — HIGH (ref 70–99)
HCT VFR BLD CALC: 31.7 % — LOW (ref 39–50)
HGB BLD-MCNC: 11.3 G/DL — LOW (ref 13–17)
MAGNESIUM SERPL-MCNC: 1.6 MG/DL — SIGNIFICANT CHANGE UP (ref 1.6–2.6)
MCHC RBC-ENTMCNC: 29.8 PG — SIGNIFICANT CHANGE UP (ref 27–34)
MCHC RBC-ENTMCNC: 35.6 G/DL — SIGNIFICANT CHANGE UP (ref 32–36)
MCV RBC AUTO: 83.6 FL — SIGNIFICANT CHANGE UP (ref 80–100)
PHOSPHATE SERPL-MCNC: 2.4 MG/DL — LOW (ref 2.5–4.5)
PLATELET # BLD AUTO: 194 K/UL — SIGNIFICANT CHANGE UP (ref 150–400)
POTASSIUM SERPL-MCNC: 4 MMOL/L — SIGNIFICANT CHANGE UP (ref 3.5–5.3)
POTASSIUM SERPL-MCNC: 4.4 MMOL/L — SIGNIFICANT CHANGE UP (ref 3.5–5.3)
POTASSIUM SERPL-SCNC: 4 MMOL/L — SIGNIFICANT CHANGE UP (ref 3.5–5.3)
POTASSIUM SERPL-SCNC: 4.4 MMOL/L — SIGNIFICANT CHANGE UP (ref 3.5–5.3)
RBC # BLD: 3.79 M/UL — LOW (ref 4.2–5.8)
RBC # FLD: 12.8 % — SIGNIFICANT CHANGE UP (ref 10.3–16.9)
SODIUM SERPL-SCNC: 129 MMOL/L — LOW (ref 135–145)
SODIUM SERPL-SCNC: 130 MMOL/L — LOW (ref 135–145)
WBC # BLD: 11.5 K/UL — HIGH (ref 3.8–10.5)
WBC # FLD AUTO: 11.5 K/UL — HIGH (ref 3.8–10.5)

## 2018-06-09 PROCEDURE — 99223 1ST HOSP IP/OBS HIGH 75: CPT | Mod: GC

## 2018-06-09 PROCEDURE — 99223 1ST HOSP IP/OBS HIGH 75: CPT

## 2018-06-09 PROCEDURE — 99233 SBSQ HOSP IP/OBS HIGH 50: CPT

## 2018-06-09 RX ORDER — ACETAMINOPHEN 500 MG
650 TABLET ORAL EVERY 6 HOURS
Qty: 0 | Refills: 0 | Status: DISCONTINUED | OUTPATIENT
Start: 2018-06-09 | End: 2018-06-13

## 2018-06-09 RX ORDER — SODIUM CHLORIDE 9 MG/ML
1000 INJECTION INTRAMUSCULAR; INTRAVENOUS; SUBCUTANEOUS
Qty: 0 | Refills: 0 | Status: DISCONTINUED | OUTPATIENT
Start: 2018-06-09 | End: 2018-06-10

## 2018-06-09 RX ORDER — SODIUM CHLORIDE 9 MG/ML
1000 INJECTION INTRAMUSCULAR; INTRAVENOUS; SUBCUTANEOUS ONCE
Qty: 0 | Refills: 0 | Status: COMPLETED | OUTPATIENT
Start: 2018-06-09 | End: 2018-06-09

## 2018-06-09 RX ORDER — MAGNESIUM SULFATE 500 MG/ML
2 VIAL (ML) INJECTION ONCE
Qty: 0 | Refills: 0 | Status: DISCONTINUED | OUTPATIENT
Start: 2018-06-09 | End: 2018-06-13

## 2018-06-09 RX ADMIN — TAMSULOSIN HYDROCHLORIDE 0.4 MILLIGRAM(S): 0.4 CAPSULE ORAL at 22:13

## 2018-06-09 RX ADMIN — Medication 100 MILLIGRAM(S): at 14:06

## 2018-06-09 RX ADMIN — LEVETIRACETAM 1000 MILLIGRAM(S): 250 TABLET, FILM COATED ORAL at 06:20

## 2018-06-09 RX ADMIN — PANTOPRAZOLE SODIUM 40 MILLIGRAM(S): 20 TABLET, DELAYED RELEASE ORAL at 14:06

## 2018-06-09 RX ADMIN — SODIUM CHLORIDE 1000 MILLILITER(S): 9 INJECTION INTRAMUSCULAR; INTRAVENOUS; SUBCUTANEOUS at 14:05

## 2018-06-09 RX ADMIN — LEVETIRACETAM 1500 MILLIGRAM(S): 250 TABLET, FILM COATED ORAL at 18:24

## 2018-06-09 RX ADMIN — SIMVASTATIN 20 MILLIGRAM(S): 20 TABLET, FILM COATED ORAL at 22:13

## 2018-06-09 RX ADMIN — Medication 62.5 MILLIMOLE(S): at 14:06

## 2018-06-09 RX ADMIN — LISINOPRIL 20 MILLIGRAM(S): 2.5 TABLET ORAL at 06:20

## 2018-06-09 RX ADMIN — Medication 100 MILLIGRAM(S): at 06:20

## 2018-06-09 RX ADMIN — Medication 1 TABLET(S): at 14:06

## 2018-06-09 RX ADMIN — Medication 100 MILLIGRAM(S): at 22:13

## 2018-06-09 NOTE — CONSULT NOTE ADULT - PROBLEM SELECTOR RECOMMENDATION 10
The patient's medical condition is optimized for surgery.  There is no contraindication for surgery.  There is no clinical evidence neither of angina, decompensated CHF, arrhthymias, nor valvular disease.   There is no limitation of exercise capacity.  MET is 5 .  ASA class is 3.  Vaughan cardiac risk factor is  high .  DVT prophylaxis is indicated.  Pain control.  Early mobilization.  Avoid fluid overload.  Venous Doppler    Cardiology evaluation    Hypernatremia      Lites and osmolality, serum was moderately, the picture most consistent with SIADH. Restrict fluid

## 2018-06-09 NOTE — PROGRESS NOTE ADULT - SUBJECTIVE AND OBJECTIVE BOX
S: no HA, sz, weakness, difficulty speaking  O: speech is fluent, knows me by name, no focal weakness or sensory deficit     CT C/A/P confirmed compression fx L1  A: no further seizures since acute bleed into cav-mal 6/7

## 2018-06-09 NOTE — CONSULT NOTE ADULT - SUBJECTIVE AND OBJECTIVE BOX
Patient is a 84y old  Male who presents with a chief complaint of IPH, seizure (2018 04:27)      HPI:  84M hx HTN, BPH, stroke in 2013 with residual seizures, on ASA 81mg QD presents with witnessed seizures x2, CTH reveals left frontal IPH, no MLS. Per family, pt was on route home from dinner and had not yet taken his 10pm keppra. He had a witnessed seizure in the cab, was brought to ER for evaluation and keppra, no CTH performed at this time, then had repeat seizure episode after being discharged from ER. Pt remembers both episodes, denies LOC, tongue biting, but did experience incontinence. He reports mild left sided back pain currently, but no headache, blurry vision, dizziness, weakness or numbness. Family reports "whole body stiffened" and body/limbs were shaking. He is currently being followed by a neurologist in [place] who changed his keppra dose, but also has seen Dr. Levy in [facility]. ICH score 1, NIH score 0. (2018 04:27)      PAST MEDICAL & SURGICAL HISTORY:  CVA (cerebral vascular accident)  Seizure  HTN (hypertension)      FAMILY HISTORY:      SOCIAL HISTORY:  Smoking Status: [ ] Current, [ ] Former, x[ ] Never  Pack Years:    MEDICATIONS:  Pulmonary:    Antimicrobials:    Anticoagulants:    Onc:    GI/:  docusate sodium 100 milliGRAM(s) Oral three times a day  pantoprazole  Injectable 40 milliGRAM(s) IV Push daily  senna 2 Tablet(s) Oral at bedtime PRN    Endocrine:  simvastatin 20 milliGRAM(s) Oral at bedtime    Cardiac:  lisinopril 20 milliGRAM(s) Oral daily  tamsulosin 0.4 milliGRAM(s) Oral at bedtime    Other Medications:  acetaminophen   Tablet 650 milliGRAM(s) Oral every 6 hours PRN  acetaminophen   Tablet. 650 milliGRAM(s) Oral every 6 hours PRN  levETIRAcetam 1000 milliGRAM(s) Oral <User Schedule>  levETIRAcetam 1500 milliGRAM(s) Oral <User Schedule>  multivitamin 1 Tablet(s) Oral daily  ondansetron Injectable 4 milliGRAM(s) IV Push every 6 hours PRN  oxyCODONE    5 mG/acetaminophen 325 mG 1 Tablet(s) Oral every 4 hours PRN  oxyCODONE    5 mG/acetaminophen 325 mG 2 Tablet(s) Oral every 4 hours PRN  sodium chloride 0.9%. 1000 milliLiter(s) IV Continuous <Continuous>      Allergies    No Known Allergies    Intolerances        Vital Signs Last 24 Hrs  T(C): 37.8 (2018 09:00), Max: 37.8 (2018 09:00)  T(F): 100 (2018 09:00), Max: 100 (2018 09:00)  HR: 74 (2018 05:19) (64 - 94)  BP: 126/65 (2018 05:19) (116/68 - 150/77)  BP(mean): 88 (2018 05:19) (87 - 108)  RR: 15 (2018 05:19) (14 - 19)  SpO2: 92% (2018 05:19) (91% - 99%)     @ 07:01  -  09 @ 07:00  --------------------------------------------------------  IN: 2075 mL / OUT: 975 mL / NET: 1100 mL          LABS:      CBC Full  -  ( 2018 06:12 )  WBC Count : 11.5 K/uL  Hemoglobin : 11.3 g/dL  Hematocrit : 31.7 %  Platelet Count - Automated : 194 K/uL  Mean Cell Volume : 83.6 fL  Mean Cell Hemoglobin : 29.8 pg  Mean Cell Hemoglobin Concentration : 35.6 g/dL  Auto Neutrophil # : x  Auto Lymphocyte # : x  Auto Monocyte # : x  Auto Eosinophil # : x  Auto Basophil # : x  Auto Neutrophil % : x  Auto Lymphocyte % : x  Auto Monocyte % : x  Auto Eosinophil % : x  Auto Basophil % : x        129<L>  |  95<L>  |  13  ----------------------------<  111<H>  4.0   |  21<L>  |  0.71    Ca    8.6      2018 06:13  Phos  2.4     06-  Mg     1.6         TPro  6.6  /  Alb  4.2  /  TBili  0.4  /  DBili  x   /  AST  29  /  ALT  26  /  AlkPhos  90      PT/INR - ( 2018 23:34 )   PT: 12.0 sec;   INR: 1.08          PTT - ( 2018 23:34 )  PTT:27.9 sec      Urinalysis Basic - ( 2018 12:38 )    Color: Yellow / Appearance: Clear / S.015 / pH: x  Gluc: x / Ketone: NEGATIVE  / Bili: Negative / Urobili: 0.2 E.U./dL   Blood: x / Protein: NEGATIVE mg/dL / Nitrite: NEGATIVE   Leuk Esterase: NEGATIVE / RBC: x / WBC x   Sq Epi: x / Non Sq Epi: x / Bacteria: x      < from: CT Chest w/ Oral Cont and w/ IV Cont (18 @ 16:15) >    EXAM:  CT CHEST OC IC                          EXAM:  CT ABDOMEN AND PELVIS OC IC                          PROCEDURE DATE:  2018          INTERPRETATION:      CT CHEST, ABDOMEN AND PELVIS    HISTORY: New brain mass and intraparenchymal hemorrhage. Metastatic   workup.    TECHNIQUE: CT scan of chest, abdomen and pelvis performed from the lung   apices through the symphysis pubis following the administration of oral   and intravenous contrast materials.  Axial, coronal, and sagittal   reformatted images were obtained.    PRIOR STUDIES: None.    FINDINGS: There is smooth interlobular septal thickening in both lungs,   likely due to pulmonary edema. There is bronchial wall thickening and   cylindrical bronchiectasis bilaterally. Linear atelectasis right lower   lobe and dependent atelectasis in both lower lobes. Multiple tree-in-bud   micronodules in each upper lobe, consistent with small airway disease of   infectious or inflammatory etiology. Calcified micronodule right upper   lobe.    There are trace bilateral pleural effusions. No pericardial effusion.    No thoracic lymphadenopathy.    Heart size within normal limits. There is heavy calcification of the   aortic valve, a finding associated with aortic stenosis. There is heavy   coronary artery calcification. 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   Thoracic aorta uncoiled. Tortuous abdominal aorta and pelvic arteries.   Calcified plaque right renal artery, possibly mild stenosis.    There are several subcentimeter low-density liver lesions which are too   small to characterize. No radiopaque stones gallbladder. Splenic   calcifications are consistent with prior granulomatous disease. Pancreas,   adrenal glands, and right kidney unremarkable. There are two   subcentimeter low-density lesions in the left kidney that are too   characterize.    No lymphadenopathy or ascites in abdomen or pelvis.    Examination gastrointestinal tract demonstrates an abnormally dilated,   fluid-filled appendix, measuring 1.2 cm. No periappendiceal inflammatory   changes. There are colonic diverticula.     Radiation seed implants are present in the prostate. There is a mesh in   the anterior pelvis.    There are compression fractures of the T6, T7, and L4 vertebral bodies.   Central lucency L4 vertebral body. There are degenerative changes of the   spine and sacroiliac joints.    IMPRESSION: 1. Abnormally dilated appendix without periappendiceal   inflammatory change. This is of uncertain significance. Recommend   clinical correlation to rule out appendicitis. Mucocele of the appendix   is in the differential.    2. Heavily calcified aortic valve, consistent with aortic stenosis.    3. Smooth interlobular septal thickening bilaterally, consistent with   pulmonary edema pattern.   < from: Echo W Bubble w/o Doppler Complete (18 @ 13:45) >    EXAM:  ECHO W BUBBLE WO DOPPLER COMP                          PROCEDURE DATE:  2018          INTERPRETATION:  Patient Height: 178.0 cm  Patient Weight: 75.0 kg  Heart Rate: 66 bpm  Systolic Pressure: 134 mmHg  Diastolic Pressure: 71 mmHg  BSA: 1.9 m^2  Interpretation Summary  Normal left ventricular size and wall thickness. The left ventricular wall   motion is normal. The left ventricular ejection fraction is estimated to   be   60-65%  The left atrial size is normal. A patent foramen ovale is   present.   Right atrial size is normal.  The right ventricle is normal in size and   function.Calcified aortic valve. No aortic regurgitation noted.There is   Mild   to moderate aortic stenosis. The calculated aortic valve area using the   continuity equation is 1.4 cm2. The peak pressure gradient is 21 mmHg.   The   mean pressure gradient is 14 mmHg. The dimensionless index (ratio of   LVOTvelocity to aortic velocity) was calculated to be 0.39. The   calculated   stroke volume index is 35 cc/m2 (normal >35cc/m2).  There is mild mitral   regurgitation.There is mild tricuspid regurgitation.There is mild   pulmonary   hypertension. The pulmonary artery systolic pressure is estimated to be   40   mmHg.  There is no pulmonic stenosis.No aortic root dilatation.The   inferior   vena cava is normal in size (<2.1 cm) with normal inspiratory collapse   (>50%)   consistent with normal right atrial pressure.  There is no pericardial   effusion.    < end of copied text >    4. There is large and small airway disease, with bronchial wall   thickening, cylindrical bronchiectasis, and tree-in-bud micronodules   bilaterally.    5. Trace bilateral pleural effusions.    6. Compression fractures of the T6, T7, and [id number] bodies. Central   lucency in L4 vertebral body could represent pathologic fracture.    7. Radiation seed implants in prostate.    8. 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    < end of copied text >      < from: 12 Lead ECG (18 @ 01:30) >  Ventricular Rate 92 BPM    Atrial Rate 92 BPM    P-R Interval 188 ms    QRS Duration 86 ms    Q-T Interval 332 ms    QTC Calculation(Bezet) 410 ms    P Axis 60 degrees    R Axis 5 degrees    T Axis 84 degrees    Diagnosis Line Normal sinus rhythm  Septal infarct , age undetermined  Nonspecific ST - T abnormalities    < end of copied text >          RADIOLOGY & ADDITIONAL STUDIES (The following images were personally reviewed):

## 2018-06-09 NOTE — CONSULT NOTE ADULT - PROBLEM SELECTOR RECOMMENDATION 7
COULD related to group II  AND III.  patient will require sleep study and PFTs as an outpatient. Venous Doppler to rule out thromboembolic disease. No clinical evidence of cor pulmonale

## 2018-06-09 NOTE — PROGRESS NOTE ADULT - ASSESSMENT
85 yo M w/ known h/o of L frontal cavernous malformation presents w/ seizures while on Keppra.  Now w/ backpain--may be from prostate CA vs injury from seizures.     transfer to SDU  d/w Dr. Levy and nsg   work up back pain  no need for urgency of CT chest/abd/pelvis w/ contrast--recommend cancelling  PT/OT eval  cont Keppra home dose  neuro checks q 4 85 yo M w/ known h/o of L frontal cavernous malformation presents w/ seizures while on Keppra.  Now w/ backpain--may be from prostate CA vs injury from seizures.     transfer to SDU   work up back pain, minimize narcotics  PT/OT eval  cont Keppra home dose  neuro checks q 4  hyponatremia, NS bolus, 100 ml/hr  re check Na in 6 hours, if < 131 start 2% 83 yo M w/ known h/o of L frontal cavernous malformation presents w/ seizures while on Keppra.  Now w/ backpain--may be from prostate CA vs injury from seizures.     work up back pain, minimize narcotics  PT/OT eval  hyponatremia, NS bolus, 100 ml/hr  re check Na in 6 hours, if < 131 start 2%   neuro checks q 4  angiogram Monday?

## 2018-06-09 NOTE — CONSULT NOTE ADULT - PROBLEM SELECTOR RECOMMENDATION 2
My concern the patient has a patent foramen ovale and at risk for paradoxical embolization. Venous Doppler there

## 2018-06-09 NOTE — CONSULT NOTE ADULT - PROBLEM SELECTOR RECOMMENDATION 8
A CT scan of the chest revealed bronchiectasis with small airway disease. No evidence of acute exacerbation and no indication for antibiotic patient will require bronchodilators postoperatively

## 2018-06-09 NOTE — CONSULT NOTE ADULT - PROBLEM SELECTOR RECOMMENDATION 9
The patient is being evaluated by neurosurgery. Patient is prepared for surgery. Most likely related to small airway disease or an LETICIA infection. Observe this point

## 2018-06-09 NOTE — PROGRESS NOTE ADULT - SUBJECTIVE AND OBJECTIVE BOX
HPI:  84M hx HTN, BPH, stroke in  with residual seizures, on ASA 81mg QD presents with witnessed seizures x2, CTH reveals left frontal IPH, no MLS. Per family, pt was on route home from dinner and had not yet taken his 10pm keppra. He had a witnessed seizure in the cab, was brought to ER for evaluation and keppra, no CTH performed at this time, then had repeat seizure episode after being discharged from ER. Pt remembers both episodes, denies LOC, tongue biting, but did experience incontinence. He reports mild left sided back pain currently, but no headache, blurry vision, dizziness, weakness or numbness. Family reports "whole body stiffened" and body/limbs were shaking. He is currently being followed by a neurologist in [place] who changed his keppra dose, but also has seen Dr. Levy in [facility]. ICH score 1, NIH score 0. (2018 04:27)    OVERNIGHT EVENTS:  Vital Signs Last 24 Hrs  T(C): 37.8 (2018 09:00), Max: 37.8 (2018 09:00)  T(F): 100 (2018 09:00), Max: 100 (2018 09:00)  HR: 74 (2018 08:53) (64 - 94)  BP: 122/65 (2018 08:53) (116/68 - 150/77)  BP(mean): 88 (2018 08:53) (87 - 108)  RR: 18 (2018 08:53) (14 - 19)  SpO2: 92% (2018 08:53) (91% - 99%)    I&O's Summary    2018 07:  -  2018 07:00  --------------------------------------------------------  IN: 2075 mL / OUT: 975 mL / NET: 1100 mL    2018 07:  -  2018 13:28  --------------------------------------------------------  IN: 360 mL / OUT: 150 mL / NET: 210 mL        PHYSICAL EXAM:  Neurological:    Motor exam:         [] Upper extremity              Bi(c5)  WE(c6)  EE(c7)   FF(c8)                                                R         5/5        5/5        5/5       5/5                                               L          5/5        5/5        5/5       5/5         [] Lower extremeity          HF(l2)   KE(l3)    TA(l4)   EHL(l5)  GS(s1)                                                 R        5/5        5/5        5/5       5/5         5/5                                               L         5/5        5/5       5/5       5/5          5/5                                                        [] warm well perfused; capillary refill <3 seconds     Sensation: [] intact to light touch  [] decreased:       Cardiovascular:  Respiratory:  Gastrointestinal:  Genitourinary:  Extremities:  Incision/Wound:    TUBES/LINES:  [] Snow  [] Lumbar Drain  [] Wound Drains  [] Others      DIET:  [] NPO  [] Mechanical  [] Tube feeds    LABS:                        11.3   11.5  )-----------( 194      ( 2018 06:12 )             31.7     06-09    129<L>  |  95<L>  |  13  ----------------------------<  111<H>  4.0   |  21<L>  |  0.71    Ca    8.6      2018 06:13  Phos  2.4     06-  Mg     1.6     06-    TPro  6.6  /  Alb  4.2  /  TBili  0.4  /  DBili  x   /  AST  29  /  ALT  26  /  AlkPhos  90  06-07    PT/INR - ( 2018 23:34 )   PT: 12.0 sec;   INR: 1.08          PTT - ( 2018 23:34 )  PTT:27.9 sec  Urinalysis Basic - ( 2018 12:38 )    Color: Yellow / Appearance: Clear / S.015 / pH: x  Gluc: x / Ketone: NEGATIVE  / Bili: Negative / Urobili: 0.2 E.U./dL   Blood: x / Protein: NEGATIVE mg/dL / Nitrite: NEGATIVE   Leuk Esterase: NEGATIVE / RBC: x / WBC x   Sq Epi: x / Non Sq Epi: x / Bacteria: x          CAPILLARY BLOOD GLUCOSE          Drug Levels: [] N/A    CSF Analysis: [] N/A      Allergies    No Known Allergies    Intolerances      MEDICATIONS:  Antibiotics:    Neuro:  acetaminophen   Tablet 650 milliGRAM(s) Oral every 6 hours PRN  acetaminophen   Tablet 650 milliGRAM(s) Oral every 6 hours PRN  acetaminophen   Tablet. 650 milliGRAM(s) Oral every 6 hours PRN  acetaminophen   Tablet. 650 milliGRAM(s) Oral every 6 hours PRN  levETIRAcetam 1000 milliGRAM(s) Oral <User Schedule>  levETIRAcetam 1500 milliGRAM(s) Oral <User Schedule>  ondansetron Injectable 4 milliGRAM(s) IV Push every 6 hours PRN    Anticoagulation:    OTHER:  docusate sodium 100 milliGRAM(s) Oral three times a day  lisinopril 20 milliGRAM(s) Oral daily  pantoprazole  Injectable 40 milliGRAM(s) IV Push daily  senna 2 Tablet(s) Oral at bedtime PRN  simvastatin 20 milliGRAM(s) Oral at bedtime  tamsulosin 0.4 milliGRAM(s) Oral at bedtime    IVF:  magnesium sulfate  IVPB 2 Gram(s) IV Intermittent once  multivitamin 1 Tablet(s) Oral daily  sodium chloride 0.9% Bolus 1000 milliLiter(s) IV Bolus once  sodium chloride 0.9%. 1000 milliLiter(s) IV Continuous <Continuous>  sodium phosphate IVPB 15 milliMole(s) IV Intermittent once    CULTURES:    RADIOLOGY & ADDITIONAL TESTS:      ASSESSMENT:  84y Male s/p    INTRACRANIAL HEMORRHAGE  Handoff  MEWS Score  CVA (cerebral vascular accident)  Seizure  HTN (hypertension)  Intracranial hemorrhage  Preoperative clearance  Pulmonary nodule  Bronchiectasis  Pulmonary hypertension  PFO (patent foramen ovale)  Aortic stenosis  HTN (hypertension)  Seizure  CVA (cerebral vascular accident)  Intracranial hemorrhage  SEIZURE  0      PLAN:  NEURO:  - neurocheck q 4  - vitals q 4   - Nacl bolus, f/u Na+ at 18:00  - IPH: Seizures controlled,   CARDIOVASCULAR:    PULMONARY:    RENAL:    GI:    HEME:    ID:    ENDO:    DVT PROPHYLAXIS:  [] Venodynes                                [] Heparin/Lovenox    DISPOSITION: HPI:  84M hx HTN, BPH, stroke in  with residual seizures, on ASA 81mg QD presents with witnessed seizures x2, CTH reveals left frontal IPH, no MLS. Per family, pt was on route home from dinner and had not yet taken his 10pm keppra. He had a witnessed seizure in the cab, was brought to ER for evaluation and keppra, no CTH performed at this time, then had repeat seizure episode after being discharged from ER. Pt remembers both episodes, denies LOC, tongue biting, but did experience incontinence. He reports mild left sided back pain currently, but no headache, blurry vision, dizziness, weakness or numbness. Family reports "whole body stiffened" and body/limbs were shaking. He is currently being followed by a neurologist in [place] who changed his keppra dose, but also has seen Dr. Levy in [facility]. ICH score 1, NIH score 0. (2018 04:27)    OVERNIGHT EVENTS:  Vital Signs Last 24 Hrs  T(C): 37.8 (2018 09:00), Max: 37.8 (2018 09:00)  T(F): 100 (2018 09:00), Max: 100 (2018 09:00)  HR: 74 (2018 08:53) (64 - 94)  BP: 122/65 (2018 08:53) (116/68 - 150/77)  BP(mean): 88 (2018 08:53) (87 - 108)  RR: 18 (2018 08:53) (14 - 19)  SpO2: 92% (2018 08:53) (91% - 99%)    I&O's Summary    2018 07:  -  2018 07:00  --------------------------------------------------------  IN: 2075 mL / OUT: 975 mL / NET: 1100 mL    2018 07:  -  2018 13:28  --------------------------------------------------------  IN: 360 mL / OUT: 150 mL / NET: 210 mL        PHYSICAL EXAM:  AAOX2-3. Normal speech; PERRL, no facial droop or asymmetry   Cranial Nerves: II-XII intact  Motor: 5/5 power in b/l UE and LE  Sensation: intact to touch in all extremities  No pronator drift.   Cardiovascular: RRR, S1, S2   Respiratory: CTA b/l   Gastrointestinal:  Genitourinary:  Extremities:  Incision/Wound:    TUBES/LINES:  [] Snow  [] Lumbar Drain  [] Wound Drains  [] Others      DIET:  [] NPO  [] Mechanical  [] Tube feeds    LABS:                        11.3   11.5  )-----------( 194      ( 2018 06:12 )             31.7     06-    129<L>  |  95<L>  |  13  ----------------------------<  111<H>  4.0   |  21<L>  |  0.71    Ca    8.6      2018 06:13  Phos  2.4     06-  Mg     1.6     -    TPro  6.6  /  Alb  4.2  /  TBili  0.4  /  DBili  x   /  AST  29  /  ALT  26  /  AlkPhos  90  06-07    PT/INR - ( 2018 23:34 )   PT: 12.0 sec;   INR: 1.08          PTT - ( 2018 23:34 )  PTT:27.9 sec  Urinalysis Basic - ( 2018 12:38 )    Color: Yellow / Appearance: Clear / S.015 / pH: x  Gluc: x / Ketone: NEGATIVE  / Bili: Negative / Urobili: 0.2 E.U./dL   Blood: x / Protein: NEGATIVE mg/dL / Nitrite: NEGATIVE   Leuk Esterase: NEGATIVE / RBC: x / WBC x   Sq Epi: x / Non Sq Epi: x / Bacteria: x          CAPILLARY BLOOD GLUCOSE          Drug Levels: [] N/A    CSF Analysis: [] N/A      Allergies    No Known Allergies    Intolerances      MEDICATIONS:  Antibiotics:    Neuro:  acetaminophen   Tablet 650 milliGRAM(s) Oral every 6 hours PRN  acetaminophen   Tablet 650 milliGRAM(s) Oral every 6 hours PRN  acetaminophen   Tablet. 650 milliGRAM(s) Oral every 6 hours PRN  acetaminophen   Tablet. 650 milliGRAM(s) Oral every 6 hours PRN  levETIRAcetam 1000 milliGRAM(s) Oral <User Schedule>  levETIRAcetam 1500 milliGRAM(s) Oral <User Schedule>  ondansetron Injectable 4 milliGRAM(s) IV Push every 6 hours PRN    Anticoagulation:    OTHER:  docusate sodium 100 milliGRAM(s) Oral three times a day  lisinopril 20 milliGRAM(s) Oral daily  pantoprazole  Injectable 40 milliGRAM(s) IV Push daily  senna 2 Tablet(s) Oral at bedtime PRN  simvastatin 20 milliGRAM(s) Oral at bedtime  tamsulosin 0.4 milliGRAM(s) Oral at bedtime    IVF:  magnesium sulfate  IVPB 2 Gram(s) IV Intermittent once  multivitamin 1 Tablet(s) Oral daily  sodium chloride 0.9% Bolus 1000 milliLiter(s) IV Bolus once  sodium chloride 0.9%. 1000 milliLiter(s) IV Continuous <Continuous>  sodium phosphate IVPB 15 milliMole(s) IV Intermittent once    CULTURES:    RADIOLOGY & ADDITIONAL TESTS:      ASSESSMENT:  84y Male s/p    INTRACRANIAL HEMORRHAGE  Handoff  MEWS Score  CVA (cerebral vascular accident)  Seizure  HTN (hypertension)  Intracranial hemorrhage  Preoperative clearance  Pulmonary nodule  Bronchiectasis  Pulmonary hypertension  PFO (patent foramen ovale)  Aortic stenosis  HTN (hypertension)  Seizure  CVA (cerebral vascular accident)  Intracranial hemorrhage  SEIZURE  0      PLAN:  NEURO:  1. IPH:   - possible cav mal, MRI report from last year needs obtained from Dr. Navas, to be reviewed with Dr. Perrin   - neurocheck q 4  - vitals q 4   - Seizures controlled,   - Continue Keppra at dose of 1000 in AM and 1500 mg in PM   - EEG reviewed with Dr. Miner, no seizure acitivity noted   - Patient refusing EEG at this time, no need to reconnect/   - CT c/a/p showed T6- T7 compression fx and L4 pathologic fx, will need an MRI for further differentiation (history of prostate CA)   - f/u with MRI T-spine and L-spine w/wo contrast   CARDIOVASCULAR:  -  continue Lisinopril  - Continue simvastatin and   - Contineu Flomax    - cardiology consult for clearance for Aortic stenosis   - Venous doppler   PULMONARY:  - doppler to r/o VTE   - f/u outpatient with pulmonary   RENAL:  - Hyponatremia   - C/w SIADH: free water restriction to 500 cc/day   - Nacl bolus, f/u Na+ at 18:00  - NS at 100 ml/hr   - voiding   - continue flomax (home med)   GI:  - regular diet   - Bowel regimen   HEME:  - H&H stable  ID:  - Leukocytosis, recent Seizure episode, afebrile at this time, will trend with cbc   ENDO:  - ISS   - PT  - OOB/ambulate with assistance   - Recommend against patient getting out of bed without assistance due to spinal compression fx   DVT PROPHYLAXIS: no chemo ppx due to recent brain bleed   [x Venodynes                                [] Heparin/Lovenox    DISPOSITION: Pending. Keep in SDU     d/w Dr. Perrin

## 2018-06-10 LAB
ANION GAP SERPL CALC-SCNC: 12 MMOL/L — SIGNIFICANT CHANGE UP (ref 5–17)
BUN SERPL-MCNC: 10 MG/DL — SIGNIFICANT CHANGE UP (ref 7–23)
CALCIUM SERPL-MCNC: 8.5 MG/DL — SIGNIFICANT CHANGE UP (ref 8.4–10.5)
CHLORIDE SERPL-SCNC: 96 MMOL/L — SIGNIFICANT CHANGE UP (ref 96–108)
CO2 SERPL-SCNC: 24 MMOL/L — SIGNIFICANT CHANGE UP (ref 22–31)
CREAT SERPL-MCNC: 0.7 MG/DL — SIGNIFICANT CHANGE UP (ref 0.5–1.3)
GLUCOSE SERPL-MCNC: 116 MG/DL — HIGH (ref 70–99)
HCT VFR BLD CALC: 32.8 % — LOW (ref 39–50)
HGB BLD-MCNC: 11.6 G/DL — LOW (ref 13–17)
MAGNESIUM SERPL-MCNC: 1.6 MG/DL — SIGNIFICANT CHANGE UP (ref 1.6–2.6)
MCHC RBC-ENTMCNC: 29.2 PG — SIGNIFICANT CHANGE UP (ref 27–34)
MCHC RBC-ENTMCNC: 35.4 G/DL — SIGNIFICANT CHANGE UP (ref 32–36)
MCV RBC AUTO: 82.6 FL — SIGNIFICANT CHANGE UP (ref 80–100)
PHOSPHATE SERPL-MCNC: 2.3 MG/DL — LOW (ref 2.5–4.5)
PLATELET # BLD AUTO: 173 K/UL — SIGNIFICANT CHANGE UP (ref 150–400)
POTASSIUM SERPL-MCNC: 3.8 MMOL/L — SIGNIFICANT CHANGE UP (ref 3.5–5.3)
POTASSIUM SERPL-SCNC: 3.8 MMOL/L — SIGNIFICANT CHANGE UP (ref 3.5–5.3)
RBC # BLD: 3.97 M/UL — LOW (ref 4.2–5.8)
RBC # FLD: 12.6 % — SIGNIFICANT CHANGE UP (ref 10.3–16.9)
SODIUM SERPL-SCNC: 132 MMOL/L — LOW (ref 135–145)
WBC # BLD: 9.4 K/UL — SIGNIFICANT CHANGE UP (ref 3.8–10.5)
WBC # FLD AUTO: 9.4 K/UL — SIGNIFICANT CHANGE UP (ref 3.8–10.5)

## 2018-06-10 PROCEDURE — 72157 MRI CHEST SPINE W/O & W/DYE: CPT | Mod: 26

## 2018-06-10 PROCEDURE — 99222 1ST HOSP IP/OBS MODERATE 55: CPT

## 2018-06-10 PROCEDURE — 72158 MRI LUMBAR SPINE W/O & W/DYE: CPT | Mod: 26

## 2018-06-10 PROCEDURE — 99233 SBSQ HOSP IP/OBS HIGH 50: CPT | Mod: 24

## 2018-06-10 PROCEDURE — 93970 EXTREMITY STUDY: CPT | Mod: 26

## 2018-06-10 RX ORDER — LABETALOL HCL 100 MG
10 TABLET ORAL EVERY 6 HOURS
Qty: 0 | Refills: 0 | Status: DISCONTINUED | OUTPATIENT
Start: 2018-06-10 | End: 2018-06-13

## 2018-06-10 RX ORDER — POTASSIUM CHLORIDE 20 MEQ
40 PACKET (EA) ORAL ONCE
Qty: 0 | Refills: 0 | Status: COMPLETED | OUTPATIENT
Start: 2018-06-10 | End: 2018-06-10

## 2018-06-10 RX ORDER — PANTOPRAZOLE SODIUM 20 MG/1
40 TABLET, DELAYED RELEASE ORAL
Qty: 0 | Refills: 0 | Status: DISCONTINUED | OUTPATIENT
Start: 2018-06-10 | End: 2018-06-13

## 2018-06-10 RX ORDER — SODIUM CHLORIDE 9 MG/ML
2 INJECTION INTRAMUSCULAR; INTRAVENOUS; SUBCUTANEOUS EVERY 8 HOURS
Qty: 0 | Refills: 0 | Status: DISCONTINUED | OUTPATIENT
Start: 2018-06-10 | End: 2018-06-13

## 2018-06-10 RX ORDER — MAGNESIUM OXIDE 400 MG ORAL TABLET 241.3 MG
400 TABLET ORAL ONCE
Qty: 0 | Refills: 0 | Status: COMPLETED | OUTPATIENT
Start: 2018-06-10 | End: 2018-06-10

## 2018-06-10 RX ORDER — HYDRALAZINE HCL 50 MG
10 TABLET ORAL
Qty: 0 | Refills: 0 | Status: DISCONTINUED | OUTPATIENT
Start: 2018-06-10 | End: 2018-06-13

## 2018-06-10 RX ADMIN — Medication 100 MILLIGRAM(S): at 22:29

## 2018-06-10 RX ADMIN — Medication 100 MILLIGRAM(S): at 05:54

## 2018-06-10 RX ADMIN — Medication 10 MILLIGRAM(S): at 05:54

## 2018-06-10 RX ADMIN — TAMSULOSIN HYDROCHLORIDE 0.4 MILLIGRAM(S): 0.4 CAPSULE ORAL at 22:29

## 2018-06-10 RX ADMIN — LEVETIRACETAM 1000 MILLIGRAM(S): 250 TABLET, FILM COATED ORAL at 05:53

## 2018-06-10 RX ADMIN — LEVETIRACETAM 1500 MILLIGRAM(S): 250 TABLET, FILM COATED ORAL at 17:09

## 2018-06-10 RX ADMIN — Medication 1 TABLET(S): at 11:21

## 2018-06-10 RX ADMIN — LISINOPRIL 20 MILLIGRAM(S): 2.5 TABLET ORAL at 05:53

## 2018-06-10 RX ADMIN — SIMVASTATIN 20 MILLIGRAM(S): 20 TABLET, FILM COATED ORAL at 22:29

## 2018-06-10 RX ADMIN — Medication 40 MILLIEQUIVALENT(S): at 11:21

## 2018-06-10 RX ADMIN — MAGNESIUM OXIDE 400 MG ORAL TABLET 400 MILLIGRAM(S): 241.3 TABLET ORAL at 11:21

## 2018-06-10 RX ADMIN — SODIUM CHLORIDE 2 GRAM(S): 9 INJECTION INTRAMUSCULAR; INTRAVENOUS; SUBCUTANEOUS at 16:37

## 2018-06-10 RX ADMIN — PANTOPRAZOLE SODIUM 40 MILLIGRAM(S): 20 TABLET, DELAYED RELEASE ORAL at 11:21

## 2018-06-10 NOTE — PROGRESS NOTE ADULT - ASSESSMENT
85 yo M w/ known h/o of L frontal cavernous malformation presents w/ seizures while on Keppra.  Now w/ backpain--may be from prostate CA vs injury from seizures.     work up back pain, minimize narcotics  PT/OT eval  hyponatremia, NS bolus, 100 ml/hr  re check Na in 6 hours, if < 131 start 2%   neuro checks q 4  angiogram Monday? 85 yo M w/ known h/o of L frontal cavernous malformation presents w/ seizures while on Keppra.  Now w/ backpain--may be from prostate CA vs injury from seizures.     hyponatremia, NS bolus, 100 ml/hr, Na stable 132  neuro checks q 4  angiogram Monday?   awaiting MRI of T/L spine for fracture workup  PT/OT  DVT proph  d/w Cam Perrin and PCP for next step

## 2018-06-10 NOTE — PROGRESS NOTE ADULT - SUBJECTIVE AND OBJECTIVE BOX
=================================  NEUROCRITICAL CARE ATTENDING NOTE  =================================    THOMAS ROBB   MRN-[id number]  Summary:  84y/M  HPI:  84M hx HTN, BPH, stroke in 2013 with residual seizures, on ASA 81mg QD presents with witnessed seizures x2, CTH reveals left frontal IPH, no MLS. Per family, pt was on route home from dinner and had not yet taken his 10pm keppra. He had a witnessed seizure in the cab, was brought to ER for evaluation and keppra, no CTH performed at this time, then had repeat seizure episode after being discharged from ER. Pt remembers both episodes, denies LOC, tongue biting, but did experience incontinence. He reports mild left sided back pain currently, but no headache, blurry vision, dizziness, weakness or numbness. Family reports "whole body stiffened" and body/limbs were shaking. He is currently being followed by a neurologist in [place] who changed his keppra dose, but also has seen Dr. Levy in [facility]. ICH score 1, NIH score 0. (08 Jun 2018 04:27)      Overnight Events:  Denies HA/N/V/Dizziness.      PHYSICAL EXAMINATION  T(C): 36.4 (06-10 @ 05:10), Max: 37.7 (06-09 @ 14:00)  HR: 92 (06-10 @ 08:44) (78 - 92)  BP: 156/97 (06-10 @ 08:44) (131/75 - 190/96)  RR: 18 (06-10 @ 08:44) (18 - 19)  SpO2: 95% (06-10 @ 08:44) (92% - 95%)  CVP(mm Hg): --    LABS:  CAPILLARY BLOOD GLUCOSE                              11.6   9.4   )-----------( 173      ( 10 Jose Manuel 2018 06:07 )             32.8     06-10    132<L>  |  96  |  10  ----------------------------<  116<H>  3.8   |  24  |  0.70    Ca    8.5      10 Jose Manuel 2018 06:07  Phos  2.3     06-10  Mg     1.6     06-10        06-09 @ 07:01  -  06-10 @ 07:00  --------------------------------------------------------  IN: 1860 mL / OUT: 2000 mL / NET: -140 mL        MEDICATIONS:  MEDICATIONS  (STANDING):  docusate sodium 100 milliGRAM(s) Oral three times a day  levETIRAcetam 1000 milliGRAM(s) Oral <User Schedule>  levETIRAcetam 1500 milliGRAM(s) Oral <User Schedule>  lisinopril 20 milliGRAM(s) Oral daily  magnesium oxide 400 milliGRAM(s) Oral once  magnesium sulfate  IVPB 2 Gram(s) IV Intermittent once  multivitamin 1 Tablet(s) Oral daily  pantoprazole    Tablet 40 milliGRAM(s) Oral before breakfast  potassium chloride    Tablet ER 40 milliEquivalent(s) Oral once  simvastatin 20 milliGRAM(s) Oral at bedtime  sodium chloride 2 Gram(s) Oral every 8 hours  tamsulosin 0.4 milliGRAM(s) Oral at bedtime    MEDICATIONS  (PRN):  acetaminophen   Tablet 650 milliGRAM(s) Oral every 6 hours PRN For Temp greater than 38 C (100.4 F)  acetaminophen   Tablet. 650 milliGRAM(s) Oral every 6 hours PRN Mild Pain (1 - 3)  hydrALAZINE Injectable 10 milliGRAM(s) IV Push every 2 hours PRN SBP>160  labetalol Injectable 10 milliGRAM(s) IV Push every 6 hours PRN SBP>160  ondansetron Injectable 4 milliGRAM(s) IV Push every 6 hours PRN Nausea and/or Vomiting  senna 2 Tablet(s) Oral at bedtime PRN Constipation =================================  NEUROCRITICAL CARE ATTENDING NOTE  =================================    THOMAS ROBB   MRN-[id number]  Summary:  84y/M  HPI:  84M hx HTN, BPH, stroke in 2013 with residual seizures, on ASA 81mg QD presents with witnessed seizures x2, CTH reveals left frontal IPH, no MLS. Per family, pt was on route home from dinner and had not yet taken his 10pm keppra. He had a witnessed seizure in the cab, was brought to ER for evaluation and keppra, no CTH performed at this time, then had repeat seizure episode after being discharged from ER. Pt remembers both episodes, denies LOC, tongue biting, but did experience incontinence. He reports mild left sided back pain currently, but no headache, blurry vision, dizziness, weakness or numbness. Family reports "whole body stiffened" and body/limbs were shaking. He is currently being followed by a neurologist in [place] who changed his keppra dose, but also has seen Dr. Levy in [facility]. ICH score 1, NIH score 0. (08 Jun 2018 04:27)      Overnight Events:  Denies HA/N/V/Dizziness.      PHYSICAL EXAMINATION  T(C): 36.4 (06-10 @ 05:10), Max: 37.7 (06-09 @ 14:00)  HR: 92 (06-10 @ 08:44) (78 - 92)  BP: 156/97 (06-10 @ 08:44) (131/75 - 190/96)  RR: 18 (06-10 @ 08:44) (18 - 19)  SpO2: 95% (06-10 @ 08:44) (92% - 95%)  CVP(mm Hg): --    LABS:  CAPILLARY BLOOD GLUCOSE                        11.6   9.4   )-----------( 173      ( 10 Jose Manuel 2018 06:07 )             32.8     06-10    Sodium, Serum: 130 mmol/L (06.09.18 @ 18:43)      132<L>  |  96  |  10  ----------------------------<  116<H>  3.8   |  24  |  0.70    Ca    8.5      10 Jose Manuel 2018 06:07  Phos  2.3     06-10  Mg     1.6     06-10    06-09 @ 07:01  -  06-10 @ 07:00  --------------------------------------------------------  IN: 1860 mL / OUT: 2000 mL / NET: -140 mL        MEDICATIONS:  MEDICATIONS  (STANDING):  docusate sodium 100 milliGRAM(s) Oral three times a day  levETIRAcetam 1000 milliGRAM(s) Oral <User Schedule>  levETIRAcetam 1500 milliGRAM(s) Oral <User Schedule>  lisinopril 20 milliGRAM(s) Oral daily  magnesium oxide 400 milliGRAM(s) Oral once  magnesium sulfate  IVPB 2 Gram(s) IV Intermittent once  multivitamin 1 Tablet(s) Oral daily  pantoprazole    Tablet 40 milliGRAM(s) Oral before breakfast  potassium chloride    Tablet ER 40 milliEquivalent(s) Oral once  simvastatin 20 milliGRAM(s) Oral at bedtime  sodium chloride 2 Gram(s) Oral every 8 hours  tamsulosin 0.4 milliGRAM(s) Oral at bedtime    MEDICATIONS  (PRN):  acetaminophen   Tablet 650 milliGRAM(s) Oral every 6 hours PRN For Temp greater than 38 C (100.4 F)  acetaminophen   Tablet. 650 milliGRAM(s) Oral every 6 hours PRN Mild Pain (1 - 3)  hydrALAZINE Injectable 10 milliGRAM(s) IV Push every 2 hours PRN SBP>160  labetalol Injectable 10 milliGRAM(s) IV Push every 6 hours PRN SBP>160  ondansetron Injectable 4 milliGRAM(s) IV Push every 6 hours PRN Nausea and/or Vomiting  senna 2 Tablet(s) Oral at bedtime PRN Constipation

## 2018-06-10 NOTE — PROGRESS NOTE ADULT - SUBJECTIVE AND OBJECTIVE BOX
HPI:  84M hx HTN, BPH, stroke in 2013 with residual seizures, on ASA 81mg QD presents with witnessed seizures x2, CTH reveals left frontal IPH, no MLS. Per family, pt was on route home from dinner and had not yet taken his 10pm keppra. He had a witnessed seizure in the cab, was brought to ER for evaluation and keppra, no CTH performed at this time, then had repeat seizure episode after being discharged from ER. Pt remembers both episodes, denies LOC, tongue biting, but did experience incontinence. He reports mild left sided back pain currently, but no headache, blurry vision, dizziness, weakness or numbness. Family reports "whole body stiffened" and body/limbs were shaking. He is currently being followed by a neurologist in [place] who changed his keppra dose, but also has seen Dr. Levy in [facility]. ICH score 1, NIH score 0. (08 Jun 2018 04:27)    OVERNIGHT EVENTS:  No events overnight      Vital Signs Last 24 Hrs  T(C): 37.1 (10 Jose Manuel 2018 13:45), Max: 37.6 (09 Jun 2018 18:28)  T(F): 98.7 (10 Jose Manuel 2018 13:45), Max: 99.6 (09 Jun 2018 18:28)  HR: 84 (10 Jose Manuel 2018 11:57) (78 - 92)  BP: 129/77 (10 Jose Manuel 2018 11:57) (129/77 - 190/96)  BP(mean): 98 (10 Jose Manuel 2018 11:57) (98 - 142)  RR: 18 (10 Jose Manuel 2018 11:57) (18 - 19)  SpO2: 96% (10 Jose Manuel 2018 11:57) (92% - 96%)    I&O's Summary    09 Jun 2018 07:01  -  10 Jose Manuel 2018 07:00  --------------------------------------------------------  IN: 1860 mL / OUT: 2000 mL / NET: -140 mL    10 Jose Manuel 2018 07:01  -  10 Jose Manuel 2018 15:31  --------------------------------------------------------  IN: 780 mL / OUT: 500 mL / NET: 280 mL        PHYSICAL EXAM:  Neurological:  Awake and alert  face symmetric  AxOx3  speech coherent  R pronator drift  5/5 motor x 4ext    TUBES/LINES:  [] Snow  [] Lumbar Drain  [] Wound Drains  [] Others      DIET:  [] NPO  [x] Mechanical  [] Tube feeds    LABS:                        11.6   9.4   )-----------( 173      ( 10 Jose Manuel 2018 06:07 )             32.8     06-10    132<L>  |  96  |  10  ----------------------------<  116<H>  3.8   |  24  |  0.70    Ca    8.5      10 Jose Manuel 2018 06:07  Phos  2.3     06-10  Mg     1.6     06-10              CAPILLARY BLOOD GLUCOSE          Drug Levels: [] N/A    CSF Analysis: [] N/A      Allergies    No Known Allergies    Intolerances      MEDICATIONS:  Antibiotics:    Neuro:  acetaminophen   Tablet 650 milliGRAM(s) Oral every 6 hours PRN  acetaminophen   Tablet. 650 milliGRAM(s) Oral every 6 hours PRN  levETIRAcetam 1000 milliGRAM(s) Oral <User Schedule>  levETIRAcetam 1500 milliGRAM(s) Oral <User Schedule>  ondansetron Injectable 4 milliGRAM(s) IV Push every 6 hours PRN    Anticoagulation:    OTHER:  docusate sodium 100 milliGRAM(s) Oral three times a day  hydrALAZINE Injectable 10 milliGRAM(s) IV Push every 2 hours PRN  labetalol Injectable 10 milliGRAM(s) IV Push every 6 hours PRN  lisinopril 20 milliGRAM(s) Oral daily  pantoprazole    Tablet 40 milliGRAM(s) Oral before breakfast  senna 2 Tablet(s) Oral at bedtime PRN  simvastatin 20 milliGRAM(s) Oral at bedtime  tamsulosin 0.4 milliGRAM(s) Oral at bedtime    IVF:  magnesium sulfate  IVPB 2 Gram(s) IV Intermittent once  multivitamin 1 Tablet(s) Oral daily  sodium chloride 2 Gram(s) Oral every 8 hours    CULTURES:    RADIOLOGY & ADDITIONAL TESTS:  < from: US Duplex Venous Lower Ext Complete, Bilateral (06.10.18 @ 14:22) >  No deep venous thrombosis seen.    < end of copied text >  < from: CT Chest w/ Oral Cont and w/ IV Cont (06.08.18 @ 16:15) >  1. Abnormally dilated appendix without periappendiceal   inflammatory change. This is of uncertain significance. Recommend   clinical correlation to rule out appendicitis. Mucocele of the appendix   is in the differential.    2. Heavily calcified aortic valve, consistent with aortic stenosis.    3. Smooth interlobular septal thickening bilaterally, consistent with   pulmonary edema pattern.     4. There is large and small airway disease, with bronchial wall   thickening, cylindrical bronchiectasis, and tree-in-bud micronodules   bilaterally.    5. Trace bilateral pleural effusions.    6. Compression fractures of the T6, T7, and [id number] bodies. Central   lucency in L4 vertebral body could represent pathologic fracture.    7. Radiation seed implants in prostate.    8. 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    < end of copied text >      ASSESSMENT:  84y Male w/ L frontal IPH    INTRACRANIAL HEMORRHAGE  Handoff  MEWS Score  CVA (cerebral vascular accident)  Seizure  HTN (hypertension)  Intracranial hemorrhage  Preoperative clearance  Pulmonary nodule  Bronchiectasis  Pulmonary hypertension  PFO (patent foramen ovale)  Aortic stenosis  HTN (hypertension)  Seizure  CVA (cerebral vascular accident)  Intracranial hemorrhage  SEIZURE  0      PLAN:  NEURO:  -MRI T/L spine pending  -neurochecks  -replace K+ and Mg  -hyponatremia - salt tabs and fluid restriction  -cardiology consult called for PFO  -EEG no seizures, discontinued  -no chemoprophylaxis due to IPH  -D/W     DVT PROPHYLAXIS:  [x] Venodynes                                [] Heparin/Lovenox    DISPOSITION:pending workup

## 2018-06-10 NOTE — PROGRESS NOTE ADULT - ASSESSMENT
S/P Cerebral Hemorrhage and seizure  Compression fractures thoracic and lumbar spine  R/O due to underlying malignancy   Await MRI

## 2018-06-10 NOTE — CONSULT NOTE ADULT - SUBJECTIVE AND OBJECTIVE BOX
HPI:  Mr. 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 His keppra dose was increased. Echocardiogram was performed which shows evidence of a PFO.     [phone]    PAST MEDICAL & SURGICAL HISTORY:  CVA (cerebral vascular accident)  Seizure  HTN (hypertension)      SOCIAL HISTORY:  - Smoking:   - Alcohol:  - Recreational drug use:  - Other:    FAMILY HISTORY:      Allergies    No Known Allergies    Intolerances        MEDICATIONS  (STANDING):  docusate sodium 100 milliGRAM(s) Oral three times a day  levETIRAcetam 1000 milliGRAM(s) Oral <User Schedule>  levETIRAcetam 1500 milliGRAM(s) Oral <User Schedule>  lisinopril 20 milliGRAM(s) Oral daily  magnesium sulfate  IVPB 2 Gram(s) IV Intermittent once  multivitamin 1 Tablet(s) Oral daily  pantoprazole    Tablet 40 milliGRAM(s) Oral before breakfast  simvastatin 20 milliGRAM(s) Oral at bedtime  sodium chloride 2 Gram(s) Oral every 8 hours  tamsulosin 0.4 milliGRAM(s) Oral at bedtime    MEDICATIONS  (PRN):  acetaminophen   Tablet 650 milliGRAM(s) Oral every 6 hours PRN For Temp greater than 38 C (100.4 F)  acetaminophen   Tablet. 650 milliGRAM(s) Oral every 6 hours PRN Mild Pain (1 - 3)  hydrALAZINE Injectable 10 milliGRAM(s) IV Push every 2 hours PRN SBP>160  labetalol Injectable 10 milliGRAM(s) IV Push every 6 hours PRN SBP>160  ondansetron Injectable 4 milliGRAM(s) IV Push every 6 hours PRN Nausea and/or Vomiting  senna 2 Tablet(s) Oral at bedtime PRN Constipation      REVIEW OF SYSTEMS:  12 point ROS negative except for that stated in the HPI    PHYSICAL EXAM:  Vitals past 24 Hours: T(C): 36.9 (06-10-18 @ 09:39), Max: 37.7 (06-09-18 @ 14:00)  HR: 84 (06-10-18 @ 11:57) (78 - 92)  BP: 129/77 (06-10-18 @ 11:57) (129/77 - 190/96)  RR: 18 (06-10-18 @ 11:57) (18 - 19)  SpO2: 96% (06-10-18 @ 11:57) (92% - 96%)	    Daily     Daily     GEN: Alert and awake, no acute distress  HEENT: Moist mucous membranes  Neck: No JVD  Cardiovascular: Regular rate and rhythm, +S1 S2. No murmurs, rubs, or gallops appreciated  Respiratory: Lungs clear to auscultation bilaterally  Gastrointestinal:  Soft, non-tender, non-distended, normoactive bowel sounds  Skin: No rashes, No ecchymoses, No cyanosis  Neurologic: Non-focal, alert and oriented x3.   Extremities: No clubbing, cyanosis or edema. Warm, well-perfused extremities.   Vascular: Radial and dorsalis pedis pulses 2+ bilaterally    I&O's Detail    09 Jun 2018 07:01  -  10 Jose Manuel 2018 07:00  --------------------------------------------------------  IN:    Oral Fluid: 360 mL    sodium chloride 0.9%: 500 mL    Sodium Chloride 0.9% IV Bolus: 1000 mL  Total IN: 1860 mL    OUT:    Voided: 2000 mL  Total OUT: 2000 mL    Total NET: -140 mL      10 Jose Manuel 2018 07:01  -  10 Jose Manuel 2018 13:33  --------------------------------------------------------  IN:    Oral Fluid: 780 mL  Total IN: 780 mL    OUT:    Voided: 500 mL  Total OUT: 500 mL    Total NET: 280 mL            LABS:                        11.6   9.4   )-----------( 173      ( 10 Jose Manuel 2018 06:07 )             32.8     06-10    132<L>  |  96  |  10  ----------------------------<  116<H>  3.8   |  24  |  0.70    Ca    8.5      10 Jose Manuel 2018 06:07  Phos  2.3     06-10  Mg     1.6     06-10              I&O's Summary    09 Jun 2018 07:01  -  10 Jose Manuel 2018 07:00  --------------------------------------------------------  IN: 1860 mL / OUT: 2000 mL / NET: -140 mL    10 Jose Manuel 2018 07:01  -  10 Jose Manuel 2018 13:33  --------------------------------------------------------  IN: 780 mL / OUT: 500 mL / NET: 280 mL        CARDIAC DIAGNOSTIC TESTING:    ECG:      TELEMETRY:     ECHO:  Interpretation Summary  Normal left ventricular size and wall thickness. The left ventricular wall   motion is normal. The left ventricular ejection fraction is estimated to   be   60-65%  The left atrial size is normal. A patent foramen ovale is   present.   Right atrial size is normal.  The right ventricle is normal in size and   function.Calcified aortic valve. No aortic regurgitation noted.There is   Mild   to moderate aortic stenosis. The calculated aortic valve area using the   continuity equation is 1.4 cm2. The peak pressure gradient is 21 mmHg.   The   mean pressure gradient is 14 mmHg. The dimensionless index (ratio of   LVOTvelocity to aortic velocity) was calculated to be 0.39. The   calculated   stroke volume index is 35 cc/m2 (normal >35cc/m2).  There is mild mitral   regurgitation.There is mild tricuspid regurgitation.There is mild   pulmonary   hypertension. The pulmonary artery systolic pressure is estimated to be   40   mmHg.  There is no pulmonic stenosis.No aortic root dilatation.The   inferior   vena cava is normal in size (<2.1 cm) with normal inspiratory collapse   (>50%)   consistent with normal right atrial pressure.  There is no pericardial   effusion.      RADIOLOGY & ADDITIONAL STUDIES:  MRI Brain:  IMPRESSION:     1. 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 Amyloid angiopathy could be considered as   an underlying etiology.   2. Linear enhancement within the location of the remote left superior   frontal lobe hematoma and a second linear focus of enhancement   inferolaterally within the left anterior frontal lobe, both of which may   represent enhancing granulation tissue. Repeat MR in 2-3 months may be   helpful for further characterization.   3. No MR evidence of intracranial mass.  4. Chronic microangiopathic disease.    ASSESSMENT/PLAN: HPI:  Mr. 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 His keppra dose was increased. Echocardiogram was performed which shows evidence of a PFO. He denies any previous history of atrial fibrillation, arrhythmias, DVTs, PEs. He states that he was on coumadin many years ago for 2 weeks but cannot recall the reason. He denies any current chest pain, dyspnea, shortness of breath, palpitations, dizziness, lightheadedness.       PAST MEDICAL & SURGICAL HISTORY:  CVA (cerebral vascular accident)  Seizure  HTN (hypertension)      SOCIAL HISTORY:  Denies tobacco use    FAMILY HISTORY:  Reviewed, non-contributory      Allergies    No Known Allergies    Intolerances        MEDICATIONS  (STANDING):  docusate sodium 100 milliGRAM(s) Oral three times a day  levETIRAcetam 1000 milliGRAM(s) Oral <User Schedule>  levETIRAcetam 1500 milliGRAM(s) Oral <User Schedule>  lisinopril 20 milliGRAM(s) Oral daily  magnesium sulfate  IVPB 2 Gram(s) IV Intermittent once  multivitamin 1 Tablet(s) Oral daily  pantoprazole    Tablet 40 milliGRAM(s) Oral before breakfast  simvastatin 20 milliGRAM(s) Oral at bedtime  sodium chloride 2 Gram(s) Oral every 8 hours  tamsulosin 0.4 milliGRAM(s) Oral at bedtime    MEDICATIONS  (PRN):  acetaminophen   Tablet 650 milliGRAM(s) Oral every 6 hours PRN For Temp greater than 38 C (100.4 F)  acetaminophen   Tablet. 650 milliGRAM(s) Oral every 6 hours PRN Mild Pain (1 - 3)  hydrALAZINE Injectable 10 milliGRAM(s) IV Push every 2 hours PRN SBP>160  labetalol Injectable 10 milliGRAM(s) IV Push every 6 hours PRN SBP>160  ondansetron Injectable 4 milliGRAM(s) IV Push every 6 hours PRN Nausea and/or Vomiting  senna 2 Tablet(s) Oral at bedtime PRN Constipation      REVIEW OF SYSTEMS:  12 point ROS negative except for that stated in the HPI    PHYSICAL EXAM:  Vitals past 24 Hours: T(C): 36.9 (06-10-18 @ 09:39), Max: 37.7 (06-09-18 @ 14:00)  HR: 84 (06-10-18 @ 11:57) (78 - 92)  BP: 129/77 (06-10-18 @ 11:57) (129/77 - 190/96)  RR: 18 (06-10-18 @ 11:57) (18 - 19)  SpO2: 96% (06-10-18 @ 11:57) (92% - 96%)	    Daily     Daily     GEN: Alert and awake, no acute distress  HEENT: Moist mucous membranes  Neck: JVP <8mmHg  Cardiovascular: Regular rate and rhythm, +S1 S2. +2/6 systolic murmur heard best over RUSB radiating to bilateral carotids.   Respiratory: Faint bibasilar rales, otherwise clear to auscultation bilaterally  Gastrointestinal:  Soft, non-tender, non-distended, normoactive bowel sounds  Extremities: No  edema. Warm, well-perfused extremities.   Vascular: Radial pulses 2+ bilaterally    I&O's Detail    09 Jun 2018 07:01  -  10 Jose Manuel 2018 07:00  --------------------------------------------------------  IN:    Oral Fluid: 360 mL    sodium chloride 0.9%: 500 mL    Sodium Chloride 0.9% IV Bolus: 1000 mL  Total IN: 1860 mL    OUT:    Voided: 2000 mL  Total OUT: 2000 mL    Total NET: -140 mL      10 Jose Manuel 2018 07:01  -  10 Jose Manuel 2018 13:33  --------------------------------------------------------  IN:    Oral Fluid: 780 mL  Total IN: 780 mL    OUT:    Voided: 500 mL  Total OUT: 500 mL    Total NET: 280 mL            LABS:                        11.6   9.4   )-----------( 173      ( 10 Jose Manuel 2018 06:07 )             32.8     06-10    132<L>  |  96  |  10  ----------------------------<  116<H>  3.8   |  24  |  0.70    Ca    8.5      10 Jose Manuel 2018 06:07  Phos  2.3     06-10  Mg     1.6     06-10              I&O's Summary    09 Jun 2018 07:01  -  10 Jose Manuel 2018 07:00  --------------------------------------------------------  IN: 1860 mL / OUT: 2000 mL / NET: -140 mL    10 Jose Manuel 2018 07:01  -  10 Jose Manuel 2018 13:33  --------------------------------------------------------  IN: 780 mL / OUT: 500 mL / NET: 280 mL        CARDIAC DIAGNOSTIC TESTING:    ECG: NSR. 90 bpm. Q waves V1-V2. Non-specific ST-T abnormalities    TELEMETRY:  Runs of ectopic atrial rhythm ?a tach vs. a fib    ECHO:  Interpretation Summary  Normal left ventricular size and wall thickness. The left ventricular wall   motion is normal. The left ventricular ejection fraction is estimated to   be   60-65%  The left atrial size is normal. A patent foramen ovale is   present.   Right atrial size is normal.  The right ventricle is normal in size and   function.Calcified aortic valve. No aortic regurgitation noted.There is   Mild   to moderate aortic stenosis. The calculated aortic valve area using the   continuity equation is 1.4 cm2. The peak pressure gradient is 21 mmHg.   The   mean pressure gradient is 14 mmHg. The dimensionless index (ratio of   LVOTvelocity to aortic velocity) was calculated to be 0.39. The   calculated   stroke volume index is 35 cc/m2 (normal >35cc/m2).  There is mild mitral   regurgitation.There is mild tricuspid regurgitation.There is mild   pulmonary   hypertension. The pulmonary artery systolic pressure is estimated to be   40   mmHg.  There is no pulmonic stenosis.No aortic root dilatation.The   inferior   vena cava is normal in size (<2.1 cm) with normal inspiratory collapse   (>50%)   consistent with normal right atrial pressure.  There is no pericardial   effusion.      RADIOLOGY & ADDITIONAL STUDIES:  MRI Brain:  IMPRESSION:     1. 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 Amyloid angiopathy could be considered as   an underlying etiology.   2. Linear enhancement within the location of the remote left superior   frontal lobe hematoma and a second linear focus of enhancement   inferolaterally within the left anterior frontal lobe, both of which may   represent enhancing granulation tissue. Repeat MR in 2-3 months may be   helpful for further characterization.   3. No MR evidence of intracranial mass.  4. Chronic microangiopathic disease.    ASSESSMENT/PLAN:  Mr. 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 Echocardiogram revealed a patent foramen ovale. U/S LE was negative for DVT. It is unclear whether he had a primary spontaneous hemorrhagic event or primary ischemic event with hemorrhagic conversion, workup being performed by primary team. If the suspicion is for primary ischemic event, it is possible that the patient has underlying atrial fibrillation given ectopic atrial rhythm seen on telemetry which could have contributed to a cardioembolic phenomenon. The patient and his family requested a conservative approach to management, if they are amenable a trans-esophageal echocardiogram may be performed to evaluate for intracardiac thrombi as a source of his CVA. He is, however, a poor candidate for anticoagulation currently. If embolic in origin possibly due tp     - Continue telemetry monitoring  - Consider ARIES for further evaluation for cardioembolic phenomenon  - Poor candidate for anticoagulation at this time  - Start metoprolol 12.5 mg BID   - Start atorvastatin 40 mg daily HPI:  Mr. 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 His keppra dose was increased. Echocardiogram was performed which shows evidence of a PFO. He denies any previous history of atrial fibrillation, arrhythmias, DVTs, PEs. He states that he was on coumadin many years ago for 2 weeks but cannot recall the reason. He denies any current chest pain, dyspnea, shortness of breath, palpitations, dizziness, lightheadedness.       PAST MEDICAL & SURGICAL HISTORY:  CVA (cerebral vascular accident)  Seizure  HTN (hypertension)      SOCIAL HISTORY:  Denies tobacco use    FAMILY HISTORY:  Reviewed, non-contributory      Allergies    No Known Allergies    Intolerances        MEDICATIONS  (STANDING):  docusate sodium 100 milliGRAM(s) Oral three times a day  levETIRAcetam 1000 milliGRAM(s) Oral <User Schedule>  levETIRAcetam 1500 milliGRAM(s) Oral <User Schedule>  lisinopril 20 milliGRAM(s) Oral daily  magnesium sulfate  IVPB 2 Gram(s) IV Intermittent once  multivitamin 1 Tablet(s) Oral daily  pantoprazole    Tablet 40 milliGRAM(s) Oral before breakfast  simvastatin 20 milliGRAM(s) Oral at bedtime  sodium chloride 2 Gram(s) Oral every 8 hours  tamsulosin 0.4 milliGRAM(s) Oral at bedtime    MEDICATIONS  (PRN):  acetaminophen   Tablet 650 milliGRAM(s) Oral every 6 hours PRN For Temp greater than 38 C (100.4 F)  acetaminophen   Tablet. 650 milliGRAM(s) Oral every 6 hours PRN Mild Pain (1 - 3)  hydrALAZINE Injectable 10 milliGRAM(s) IV Push every 2 hours PRN SBP>160  labetalol Injectable 10 milliGRAM(s) IV Push every 6 hours PRN SBP>160  ondansetron Injectable 4 milliGRAM(s) IV Push every 6 hours PRN Nausea and/or Vomiting  senna 2 Tablet(s) Oral at bedtime PRN Constipation      REVIEW OF SYSTEMS:  12 point ROS negative except for that stated in the HPI    PHYSICAL EXAM:  Vitals past 24 Hours: T(C): 36.9 (06-10-18 @ 09:39), Max: 37.7 (06-09-18 @ 14:00)  HR: 84 (06-10-18 @ 11:57) (78 - 92)  BP: 129/77 (06-10-18 @ 11:57) (129/77 - 190/96)  RR: 18 (06-10-18 @ 11:57) (18 - 19)  SpO2: 96% (06-10-18 @ 11:57) (92% - 96%)	    Daily     Daily     GEN: Alert and awake, no acute distress  HEENT: Moist mucous membranes  Neck: JVP <8mmHg  Cardiovascular: Regular rate and rhythm, +S1 S2. +2/6 systolic murmur heard best over RUSB radiating to bilateral carotids.   Respiratory: Faint bibasilar rales, otherwise clear to auscultation bilaterally  Gastrointestinal:  Soft, non-tender, non-distended, normoactive bowel sounds  Extremities: No  edema. Warm, well-perfused extremities.   Vascular: Radial pulses 2+ bilaterally    I&O's Detail    09 Jun 2018 07:01  -  10 Jose Manuel 2018 07:00  --------------------------------------------------------  IN:    Oral Fluid: 360 mL    sodium chloride 0.9%: 500 mL    Sodium Chloride 0.9% IV Bolus: 1000 mL  Total IN: 1860 mL    OUT:    Voided: 2000 mL  Total OUT: 2000 mL    Total NET: -140 mL      10 Jose Manuel 2018 07:01  -  10 Jose Manuel 2018 13:33  --------------------------------------------------------  IN:    Oral Fluid: 780 mL  Total IN: 780 mL    OUT:    Voided: 500 mL  Total OUT: 500 mL    Total NET: 280 mL            LABS:                        11.6   9.4   )-----------( 173      ( 10 Jose Manuel 2018 06:07 )             32.8     06-10    132<L>  |  96  |  10  ----------------------------<  116<H>  3.8   |  24  |  0.70    Ca    8.5      10 Jose Manuel 2018 06:07  Phos  2.3     06-10  Mg     1.6     06-10              I&O's Summary    09 Jun 2018 07:01  -  10 Jose Manuel 2018 07:00  --------------------------------------------------------  IN: 1860 mL / OUT: 2000 mL / NET: -140 mL    10 Jose Manuel 2018 07:01  -  10 Jose Manuel 2018 13:33  --------------------------------------------------------  IN: 780 mL / OUT: 500 mL / NET: 280 mL        CARDIAC DIAGNOSTIC TESTING:    ECG: NSR. 90 bpm. Q waves V1-V2. Non-specific ST-T abnormalities    TELEMETRY:  Runs of ectopic atrial rhythm ?a tach vs. a fib    ECHO:  Interpretation Summary  Normal left ventricular size and wall thickness. The left ventricular wall   motion is normal. The left ventricular ejection fraction is estimated to   be   60-65%  The left atrial size is normal. A patent foramen ovale is   present.   Right atrial size is normal.  The right ventricle is normal in size and   function.Calcified aortic valve. No aortic regurgitation noted.There is   Mild   to moderate aortic stenosis. The calculated aortic valve area using the   continuity equation is 1.4 cm2. The peak pressure gradient is 21 mmHg.   The   mean pressure gradient is 14 mmHg. The dimensionless index (ratio of   LVOTvelocity to aortic velocity) was calculated to be 0.39. The   calculated   stroke volume index is 35 cc/m2 (normal >35cc/m2).  There is mild mitral   regurgitation.There is mild tricuspid regurgitation.There is mild   pulmonary   hypertension. The pulmonary artery systolic pressure is estimated to be   40   mmHg.  There is no pulmonic stenosis.No aortic root dilatation.The   inferior   vena cava is normal in size (<2.1 cm) with normal inspiratory collapse   (>50%)   consistent with normal right atrial pressure.  There is no pericardial   effusion.      RADIOLOGY & ADDITIONAL STUDIES:  MRI Brain:  IMPRESSION:     1. 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 Amyloid angiopathy could be considered as   an underlying etiology.   2. Linear enhancement within the location of the remote left superior   frontal lobe hematoma and a second linear focus of enhancement   inferolaterally within the left anterior frontal lobe, both of which may   represent enhancing granulation tissue. Repeat MR in 2-3 months may be   helpful for further characterization.   3. No MR evidence of intracranial mass.  4. Chronic microangiopathic disease.    ASSESSMENT/PLAN:  Mr. 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 Echocardiogram revealed a patent foramen ovale. U/S LE was negative for DVT. It is unclear whether he had a primary spontaneous hemorrhagic event or primary ischemic event with hemorrhagic conversion, workup being performed by primary team. If there is a suspicion for a cardioembolic event, differentials include arrhythmogenic cause vs. paradoxical emboli via PFO. Telemetry shows short bursts of ectopic atrial rhythm. He is currently a poor candidate for anticoagulation. If workup from neurology and neurosurgery team for source of hemorrhage is negative, and the patient and family are amenable, a trans-esophageal echocardiogram may be performed to evaluate for intracardiac thrombi as a source of his CVA.     - Continue telemetry monitoring  - Consider ARIES for further evaluation for cardioembolic phenomenon  - Poor candidate for anticoagulation at this time  - Start metoprolol 12.5 mg BID   - Start high dose statin    Thank you for this consult, recommendations discussed with Dr. Kim.      Abby Leslie  Cardiology Fellow

## 2018-06-10 NOTE — PROGRESS NOTE ADULT - ASSESSMENT
Metabolic encephalopathy.  Hyponatremia ? Dilutional vs SIADH due to CNS hemorrhage.   Would repeat labs after Na replacement with IV saline and consider fluid restriction for presumed SIADH if not better.  Discussed with neurosurgery team

## 2018-06-11 LAB
ANION GAP SERPL CALC-SCNC: 11 MMOL/L — SIGNIFICANT CHANGE UP (ref 5–17)
BUN SERPL-MCNC: 10 MG/DL — SIGNIFICANT CHANGE UP (ref 7–23)
CALCIUM SERPL-MCNC: 8.8 MG/DL — SIGNIFICANT CHANGE UP (ref 8.4–10.5)
CHLORIDE SERPL-SCNC: 99 MMOL/L — SIGNIFICANT CHANGE UP (ref 96–108)
CO2 SERPL-SCNC: 24 MMOL/L — SIGNIFICANT CHANGE UP (ref 22–31)
CREAT SERPL-MCNC: 0.71 MG/DL — SIGNIFICANT CHANGE UP (ref 0.5–1.3)
GLUCOSE SERPL-MCNC: 103 MG/DL — HIGH (ref 70–99)
HCT VFR BLD CALC: 32.7 % — LOW (ref 39–50)
HGB BLD-MCNC: 11.6 G/DL — LOW (ref 13–17)
MAGNESIUM SERPL-MCNC: 1.8 MG/DL — SIGNIFICANT CHANGE UP (ref 1.6–2.6)
MCHC RBC-ENTMCNC: 29.7 PG — SIGNIFICANT CHANGE UP (ref 27–34)
MCHC RBC-ENTMCNC: 35.5 G/DL — SIGNIFICANT CHANGE UP (ref 32–36)
MCV RBC AUTO: 83.8 FL — SIGNIFICANT CHANGE UP (ref 80–100)
PHOSPHATE SERPL-MCNC: 2.6 MG/DL — SIGNIFICANT CHANGE UP (ref 2.5–4.5)
PLATELET # BLD AUTO: 171 K/UL — SIGNIFICANT CHANGE UP (ref 150–400)
POTASSIUM SERPL-MCNC: 3.8 MMOL/L — SIGNIFICANT CHANGE UP (ref 3.5–5.3)
POTASSIUM SERPL-SCNC: 3.8 MMOL/L — SIGNIFICANT CHANGE UP (ref 3.5–5.3)
RBC # BLD: 3.9 M/UL — LOW (ref 4.2–5.8)
RBC # FLD: 12.8 % — SIGNIFICANT CHANGE UP (ref 10.3–16.9)
SODIUM SERPL-SCNC: 134 MMOL/L — LOW (ref 135–145)
WBC # BLD: 7.9 K/UL — SIGNIFICANT CHANGE UP (ref 3.8–10.5)
WBC # FLD AUTO: 7.9 K/UL — SIGNIFICANT CHANGE UP (ref 3.8–10.5)

## 2018-06-11 PROCEDURE — 99232 SBSQ HOSP IP/OBS MODERATE 35: CPT

## 2018-06-11 PROCEDURE — 99233 SBSQ HOSP IP/OBS HIGH 50: CPT

## 2018-06-11 PROCEDURE — 99223 1ST HOSP IP/OBS HIGH 75: CPT

## 2018-06-11 RX ORDER — METOPROLOL TARTRATE 50 MG
12.5 TABLET ORAL
Qty: 0 | Refills: 0 | Status: DISCONTINUED | OUTPATIENT
Start: 2018-06-11 | End: 2018-06-12

## 2018-06-11 RX ORDER — MAGNESIUM OXIDE 400 MG ORAL TABLET 241.3 MG
400 TABLET ORAL ONCE
Qty: 0 | Refills: 0 | Status: COMPLETED | OUTPATIENT
Start: 2018-06-11 | End: 2018-06-11

## 2018-06-11 RX ORDER — POTASSIUM CHLORIDE 20 MEQ
40 PACKET (EA) ORAL ONCE
Qty: 0 | Refills: 0 | Status: COMPLETED | OUTPATIENT
Start: 2018-06-11 | End: 2018-06-11

## 2018-06-11 RX ADMIN — TAMSULOSIN HYDROCHLORIDE 0.4 MILLIGRAM(S): 0.4 CAPSULE ORAL at 21:43

## 2018-06-11 RX ADMIN — SODIUM CHLORIDE 2 GRAM(S): 9 INJECTION INTRAMUSCULAR; INTRAVENOUS; SUBCUTANEOUS at 17:35

## 2018-06-11 RX ADMIN — MAGNESIUM OXIDE 400 MG ORAL TABLET 400 MILLIGRAM(S): 241.3 TABLET ORAL at 10:04

## 2018-06-11 RX ADMIN — Medication 100 MILLIGRAM(S): at 14:42

## 2018-06-11 RX ADMIN — Medication 100 MILLIGRAM(S): at 06:34

## 2018-06-11 RX ADMIN — Medication 12.5 MILLIGRAM(S): at 17:34

## 2018-06-11 RX ADMIN — LISINOPRIL 20 MILLIGRAM(S): 2.5 TABLET ORAL at 06:34

## 2018-06-11 RX ADMIN — LEVETIRACETAM 1500 MILLIGRAM(S): 250 TABLET, FILM COATED ORAL at 17:34

## 2018-06-11 RX ADMIN — PANTOPRAZOLE SODIUM 40 MILLIGRAM(S): 20 TABLET, DELAYED RELEASE ORAL at 06:34

## 2018-06-11 RX ADMIN — SIMVASTATIN 20 MILLIGRAM(S): 20 TABLET, FILM COATED ORAL at 21:43

## 2018-06-11 RX ADMIN — SODIUM CHLORIDE 2 GRAM(S): 9 INJECTION INTRAMUSCULAR; INTRAVENOUS; SUBCUTANEOUS at 01:45

## 2018-06-11 RX ADMIN — LEVETIRACETAM 1000 MILLIGRAM(S): 250 TABLET, FILM COATED ORAL at 06:34

## 2018-06-11 RX ADMIN — Medication 40 MILLIEQUIVALENT(S): at 10:05

## 2018-06-11 RX ADMIN — SODIUM CHLORIDE 2 GRAM(S): 9 INJECTION INTRAMUSCULAR; INTRAVENOUS; SUBCUTANEOUS at 10:05

## 2018-06-11 RX ADMIN — Medication 1 TABLET(S): at 12:04

## 2018-06-11 NOTE — CONSULT NOTE ADULT - ASSESSMENT
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  On 6/8/18, pt re-presented to [facility] ED after another witnessed seizure at home with associated urinary incontinence.  CT head demonstrated intraparenchymal hemorrhage within the left frontal subcortical white matter, approx. 2cm without midline shift or mass effect.  Neurology and neurosurgery were consulted and pt admitted for further evaluation and medical care.  Additionally, pt underwent ECHO on 6/8/18 which revealed EF 60%, PFO, mild to moderate AS with SAMUEL 1.4cm2, peak/mean gradients 21/14mmHg, mild MR/TR, mild pulm HTN with PASP 40mmHg, no pericardial effusion.  Dr. Robles, Structural heart, consulted for procedural evaluation of PFO.    Plan, as discussed with Dr. Robles.     1. PFO  -ARIES today to evaluate PFO, will f/u results.  -Anticoagulation as per primary team and if cleared by neuro/neurosurg  -No procedural intervention at this time.     Thank you for this consultation, will continue to follow.

## 2018-06-11 NOTE — OCCUPATIONAL THERAPY INITIAL EVALUATION ADULT - GENERAL OBSERVATIONS, REHAB EVAL
Ambidextrous. Patient cleared for Occupational Therapy by SUMMER Naylor, patient received seated in B/S chair in non-acute distress, SUMMER Naylor present. +IV heplock, +tele, +chair alarm. Patient denies pain, no complaint.

## 2018-06-11 NOTE — OCCUPATIONAL THERAPY INITIAL EVALUATION ADULT - STANDING BALANCE: DYNAMIC, REHAB EVAL
ambulated total of 20 feet with RW and modAX1/poor plus ambulated total of 20 feet with RW and minAX1, VC for sequencing with RW/poor plus

## 2018-06-11 NOTE — PROGRESS NOTE ADULT - ASSESSMENT
84M with  1.  L frontal intracerebral hemorrhage,  h/o L frontal cavernous malformation  2.  compression fractures T6 T7 L4 vertebral bodies  3.  seizure disorder  4.  h/o CVA  5.  Hypertension   6.  hyponatremia   7.  (+) PFO, mod AS    PLAN:   NEURO: neurochecks q4h, PRN pain meds with Tylenol  seizures:  continue home dose of levetiracetam   REHAB:  physical therapy evaluation and management    EARLY MOB:      PULM:  Room air, incentive spirometry  CARDIO:  SBP goal 100-150mm Hg, continue metoprolol; echo: EF 60-65%, (+) PFO, ARIES today  ENDO:  Blood sugar goals 140-180 mg/dL, continue statins  GI:  PPI for GI prophylaxis  DIET: regular diet  RENAL:  IVL, continue tamsulosin, recheck BMP daily  HEM/ONC: Hb stable  VTE Prophylaxis:  SCDs, no DVT chemoprophylaxis for now as patient is high risk for bleed (multiple ICH, ?cavernous malformation)  ID: afebrile, no leukocytosis  Social: will update family  DISPO planning    ATTENDING ATTESTATION:  I was physically present for the key portions of the evaluation and management (E/M) service provided.  I agree with the above history, physical and plan which I have reviewed and edited where appropriate.     Patient not at high risk for neurologic deterioration / death.  Time spent on this noncritically ill patient: 45 minutes spent on total encounter, more than 50% of the visit was spent counseling and/or coordinating care by the attending physician.    Plan discussed with RN, house staff.

## 2018-06-11 NOTE — PROGRESS NOTE ADULT - SUBJECTIVE AND OBJECTIVE BOX
=================================  NEUROCRITICAL CARE ATTENDING NOTE  =================================    THOMAS ROBB   MRN-[id number]  Summary:  84M with h/o stroke (2013, seizures.  now presents with 2 episodes of seizures.  Brought to ED where CT showed L frontal ICH.  Overnight Events: No significant events overnight.  REVIEW OF SYSTEMS:  No headaches, no nausea or vomiting; 14 -point review of systems otherwise unremarkable.    Past Medical History: CVA (cerebral vascular accident) Seizure HTN (hypertension)  Allergies:  No Known Allergies  Home meds: ?    PHYSICAL EXAMINATION  T(C): 37.2 ( @ 14:30), Max: 37.3 (06-10 @ 22:24) HR: 74 ( @ 12:16) (74 - 90) BP: 138/77 ( @ 12:16) (124/76 - 168/81) RR: 18 ( @ 12:16) (16 - 18) SpO2: 99% ( @ 12:16) (92% - 99%)  NEUROLOGIC EXAMINATION:  Patient is awake, alert, fully oriented, pupils 2-3mm equal and briskly reactive to light, EOMs intact, muscle strength 5/5 on all 4 extremities  GENERAL:  not intubated, not in cardiorespiratory distress  EENT: anicteric  CARDIOVASC:  (+) S1 S2, normal rate and regular rhythm  PULMONARY:  clear to auscultation bilaterally  ABDOMEN:  soft, nontender, with normoactive bowel sounds  EXTREMITIES:  no edema  SKIN:  no rash    LABS:             11.6   7.9   )-----------( 171      ( 2018 06:50 )             32.7     134<L>  |  99  |  10  ----------------------------<  103<H>  3.8   |  24  |  0.71    Ca    8.8      2018 06:50  Phos  2.6     -  Mg     1.8         0610 @ 07:01  -   @ 07:00  IN: 780 mL / OUT: 1550 mL / NET: -770 mL    Bacteriology:  CSF studies:  EEG:  Neuroimagin/10 MR spine:  compression deformity L4, benign compression fracture related to osteopenia   MRI: early subacute L frontal hematoma with surrounding edema, consider amyloid angiopathy, no MR evidence of intracranial mass, SVID   CT head:  ICH L frontal, no MLS   CTA head:  markedly tortuous R and L ICA, no significant stenosis  Other imagin/08 CT chest / abd / pelvis:  abnormally dilated appendix ?mucocele, calcified aortic valve, pulmo edema, large and small airway disease / bronchiectasis; trace bilateral pleural effusion; compression fractures T6 T7 L4 vertebral bodies, central lucency in L4 ?pathologic fracture; radiation seed implants in prostate, 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  06/10 Doppler: NEG    MEDICATIONS: docusate levetiracetam 1G / 1.5G lisinopril 20mg daily metoprolol 12.5mg BID MVI pantoprazole 40 daily simvastatin 20mg HS salt 2g q8h PO tamsulosin HS senna PRN    IV FLUIDS: IVL  DRIPS:  DIET: regular diet  Lines:  Wounds:    CODE STATUS:  Full Code                       GOALS OF CARE:  aggressive                      DISPOSITION:  8La  ICH Score on admission  = Age >=80 = 1  Estimated 30-day mortality 1 point: 13%

## 2018-06-11 NOTE — OCCUPATIONAL THERAPY INITIAL EVALUATION ADULT - PERTINENT HX OF CURRENT PROBLEM, REHAB EVAL
84M hx HTN, BPH, stroke in 2013 with residual seizures, on ASA 81mg QD presents with witnessed seizures x2, CTH reveals left frontal IPH, no MLS. Per family, pt was on route home from dinner and had not yet taken his 10pm keppra. He had a witnessed seizure in the cab, was brought to ER for evaluation and keppra, no CTH performed at this time, then had repeat seizure episode after being discharged from ER.

## 2018-06-11 NOTE — OCCUPATIONAL THERAPY INITIAL EVALUATION ADULT - PLANNED THERAPY INTERVENTIONS, OT EVAL
neuromuscular re-education/ADL retraining/IADL retraining/balance training/bed mobility training/parent/caregiver training.../transfer training/strengthening

## 2018-06-11 NOTE — OCCUPATIONAL THERAPY INITIAL EVALUATION ADULT - LEVEL OF INDEPENDENCE: TOILET, REHAB EVAL
moderate assist (50% patients effort) moderate assist (50% patients effort)/minimum assist (75% patients effort)

## 2018-06-11 NOTE — PROGRESS NOTE ADULT - SUBJECTIVE AND OBJECTIVE BOX
Patient feeling well.  PE  Afebrile BP slightly high 150/80 chest clear.  MRI of spine reviewed with no obvious evidence for pathologic fractures.  ARIES showed PFO but patient's incidents seem hemorrhagic and not thrombotic (recurrences of bleeding 3 times in same area).

## 2018-06-11 NOTE — OCCUPATIONAL THERAPY INITIAL EVALUATION ADULT - LEVEL OF INDEPENDENCE: STAND/SIT, REHAB EVAL
minimum assist (75% patients effort)/moderate assist (50% patients effort) minimum assist (75% patients effort)

## 2018-06-11 NOTE — OCCUPATIONAL THERAPY INITIAL EVALUATION ADULT - ADDITIONAL COMMENTS
Patient and spouse live 50/50 in 2 residencies ([facility] and [place]). Reports full independence with functional mobility and Activities of Daily Living without AD PTA.

## 2018-06-11 NOTE — CONSULT NOTE ADULT - ATTENDING COMMENTS
85yo male with history of HTN, BPH, CVA in 2013 with residual seizures (on Keppra), prostate CA s/p seeding in 2013 who presented to after suffering acute seizure. CT head demonstrated intraparenchymal hemorrhage within the left frontal subcortical white matter, approx. 2cm without midline shift or mass effect. TTE showed EF 60%, PFO, mild to moderate AS with SAMUEL 1.4cm2, peak/mean gradients 21/14mmHg, mild MR/TR, mild pulm HTN with PASP 40mmHg, no pericardial effusion. reported afib/aflutter on monitor. exam remarkable 2/6 MATT base, cta bl no wrr, sntnd +bs no cce. imp: complicated neuro hx/sz d/o, hemorrhagic CVA, presence of PFO; afib/flutter on tele; htn.  -not candidate for PFO closure given complicated neuro hx and alternative etiology of CVA  -consider for LAAO; d/w EPS
Assessment: Patient personally seen and examined myself during rounds with the Physician Assistant/House Staff/Nurse Practitioner   ON DATE 6/10/18  Physician Assistant/House Staff/Nurse Practitioner note read, including vitals, physical findings, laboratory data, and radiological reports.   Revisions included below.   Direct personal management at bed side and extensive interpretation of the data.    Plan was outlined and discussed in details with the Physician Assistant/House Staff/Nurse Practitioner.    Decision making of high complexity   Risk high of complications, morbidity, and/or mortality  Assessment and Action taken for acute disease activity to reflect the level of care provided:  -Hemodynamic evaluation and support  -Medication reconciliation  -Review laboratory data  -EKG reviewed   -Echo reviewed   -ACS assessment and treatment as applicable  -Heart failure assessment and treatment as applicable  -Cardiac Telemetry reviewed  -Interdisciplinary discussion with IC / EP / HF / CTS teams as needed  TIME SPENT in evaluation and management, reassessments, review and interpretation of labs and x-rays, and hemodynamic management, formulating a plan and coordinating care: ___50____ MIN.  Time does not include procedural time.
Patient seen and examined with house-staff during bedside rounds.  Resident note read, including vitals, physical findings, laboratory data, and radiological reports.   Revisions included below.  Direct personal management at bed side and extensive interpretation of the data.  Plan was outlined and discussed in details with the housestaff.  Decision making of high complexity  Action taken for acute disease activity to reflect the level of care provided:  - medication reconciliation  - review laboratory data  Preoperative evaluation and risk assessment    Management of comorbidity

## 2018-06-11 NOTE — CONSULT NOTE ADULT - SUBJECTIVE AND OBJECTIVE BOX
Surgeon: Dr. Robles     Requesting Physician: Dr. Kim    HISTORY OF PRESENT ILLNESS:  This is an 84 year old male with history of HTN, BPH, CVA in 2013 with residual seizures (on Keppra), prostate CA s/p seeding in 2013 who presented to [facility] on 6/7/18 after suffering acute seizure while in cab home from family dinner, discharged home after medically cleared in ED.  On 6/8/18, pt re-presented to [facility] ED after another witnessed seizure at home with associated urinary incontinence.  CT head demonstrated intraparenchymal hemorrhage within the left frontal subcortical white matter, approx. 2cm without midline shift or mass effect.  Neurology and neurosurgery were consulted and pt admitted for further evaluation and medical care.  Additionally, pt underwent ECHO on 6/8/18 which revealed EF 60%, PFO, mild to moderate AS with SAMUEL 1.4cm2, peak/mean gradients 21/14mmHg, mild MR/TR, mild pulm HTN with PASP 40mmHg, no pericardial effusion.  Dr. Robles, Structural heart, consulted for procedural evaluation of PFO.  On evaluation, pt states that he feels well today with no recurrent seizures while inpatient.  Denies HA, AMS, CP, palpitations, SOB, cough, hemoptysis, n/v/d, fever.       PAST MEDICAL & SURGICAL HISTORY:  CVA (cerebral vascular accident)  Seizure  HTN (hypertension)      MEDICATIONS  (STANDING):  docusate sodium 100 milliGRAM(s) Oral three times a day  levETIRAcetam 1000 milliGRAM(s) Oral <User Schedule>  levETIRAcetam 1500 milliGRAM(s) Oral <User Schedule>  lisinopril 20 milliGRAM(s) Oral daily  magnesium sulfate  IVPB 2 Gram(s) IV Intermittent once  metoprolol tartrate 12.5 milliGRAM(s) Oral two times a day  multivitamin 1 Tablet(s) Oral daily  pantoprazole    Tablet 40 milliGRAM(s) Oral before breakfast  simvastatin 20 milliGRAM(s) Oral at bedtime  sodium chloride 2 Gram(s) Oral every 8 hours  tamsulosin 0.4 milliGRAM(s) Oral at bedtime    MEDICATIONS  (PRN):  acetaminophen   Tablet 650 milliGRAM(s) Oral every 6 hours PRN For Temp greater than 38 C (100.4 F)  acetaminophen   Tablet. 650 milliGRAM(s) Oral every 6 hours PRN Mild Pain (1 - 3)  hydrALAZINE Injectable 10 milliGRAM(s) IV Push every 2 hours PRN SBP>160  labetalol Injectable 10 milliGRAM(s) IV Push every 6 hours PRN SBP>160  ondansetron Injectable 4 milliGRAM(s) IV Push every 6 hours PRN Nausea and/or Vomiting  senna 2 Tablet(s) Oral at bedtime PRN Constipation    Allergies    No Known Allergies    Intolerances    SOCIAL HISTORY:  Smoker:  Former, 4 pack years, quit 65 years ago  ETOH use:   NO      Ilicit Drug use:  NO  Occupation: Retired   Assisted device use (Cane / Walker): No  Live with: Family    FAMILY HISTORY:    Review of Systems  CONSTITUTIONAL:  Denies Fevers / chills, sweats, fatigue, weight loss, weight gain                                      NEURO: +seizures, see HPI;  Denies paresthesia, syncope, confusion                                                                                EYES:  Denies Blurry vision, discharge, pain, loss of vision                                                                                    ENMT:  Denies Difficulty hearing, vertigo, dysphagia, epistaxis, recent dental work                                       CV:  Denies Chest pain, palpitations, LOPEZ, orthopnea                                                                                          RESPIRATORY:  Denies Wheezing, SOB, cough / sputum, hemoptysis                                                                GI:  Denies Nausea, vomiting, diarrhea, constipation, melena, difficulty swallowing                                               : Denies Hematuria, dysuria, urgency, incontinence                                                                                         MUSCULOSKELETAL:  Denies arthritis, joint swelling, muscle weakness                                                             SKIN/BREAST:  Denies rash, itching, hair loss, masses                                                                                            PSYCH:  Denies depression, anxiety, suicidal ideation                                                                                               HEME/LYMPH:  Denies bruises easily, enlarged lymph nodes, tender lymph nodes                                        ENDOCRINE:  Denies cold intolerance, heat intolerance, polydipsia                                                                      PHYSICAL EXAM  Vital Signs Last 24 Hrs  T(C): 37.2 (11 Jun 2018 09:00), Max: 37.3 (10 Jose Manuel 2018 22:24)  T(F): 99 (11 Jun 2018 09:00), Max: 99.1 (10 Jose Manuel 2018 22:24)  HR: 78 (11 Jun 2018 10:35) (76 - 90)  BP: 139/78 (11 Jun 2018 10:35) (124/76 - 168/81)  BP(mean): 103 (11 Jun 2018 10:35) (94 - 115)  RR: 18 (11 Jun 2018 08:43) (16 - 18)  SpO2: 96% (11 Jun 2018 08:43) (92% - 96%)    Appearance: No acute distress.  Neurologic: AAOx3, no AMS or focal deficits.  Responds appropriately to verbal and physical stimuli; exhibits purposeful movement in all extremities.  HEENT:   MMM, PERRLA, EOMIb/l  Neck: Supple,  - JVD; - Carotid Bruit   Cardiovascular: RRR, S1/S2. No m/r/g.  Respiratory: No acute respiratory distress. CTA b/l, no w/r/r.   Gastrointestinal:  Soft, non-tender, non-distended, + BS.	  Skin: No rashes. No ecchymoses. No cyanosis.  Extremities: Exhibits normal range of motion, no clubbing, cyanosis or edema.  Vascular: Peripheral pulses palpable 2+ bilaterally.                                                           LABS:                        11.6   7.9   )-----------( 171      ( 11 Jun 2018 06:50 )             32.7     06-11    134<L>  |  99  |  10  ----------------------------<  103<H>  3.8   |  24  |  0.71    Ca    8.8      11 Jun 2018 06:50  Phos  2.6     06-11  Mg     1.8     06-11    Thyroid Stimulating Hormone, Serum: 1.030 uIU/mL (06-07 @ 23:34)  Hemoglobin A1C, Whole Blood: 4.9 % (06-07 @ 23:34)    RADIOLOGY & ADDITIONAL STUDIES:  CAROTID U/S: pending    CXR:  < from: Xray Chest 1 View-PORTABLE IMMEDIATE (06.08.18 @ 04:19) >    EXAM:  XR CHEST PORTABLE IMMED 1V                          PROCEDURE DATE:  06/08/2018          INTERPRETATION:  Chest x-ray    Indication: Preoperative study    A portable frontal view of the chest is compared to the prior study dated   11/23/2017. Normal heart size. Tortuous aorta. No pulmonary consolidation   is seen. No pleural effusion or pneumothorax.      IMPRESSION: Clear lungs.    < end of copied text >      CT Scan:  < from: CT Angio Head w/ IV Cont (06.08.18 @ 04:58) >  IMPRESSION:  1. Superior left frontal round hyperdense lesion located measuring about   1.9 x 1.6 x 1.5 cm size and  74 HU density. There is moderate surrounding vasogenic edema.  This likely represents hemorrhagic or hyperdense metastasis.  Other areas of hypodense change in superior-most left frontal lobe and   inferior left frontal lobe may  represent vasogenic edema from underlying mass lesions.  MR head without and with contrast recommended (if there are no   contraindications).    2. Markedly tortuous right and left internal carotid arteries' distal   cervical segments.  Right internal carotid artery's cavernous and supraclinoid segments shows   mild calcified  atherosclerotic plaque causing mild stenosis (about 10% diameter   stenosis).    3. Mild calcified atherosclerotic plaque in intracranial left vertebral   artery causing mild stenosis (about  20% diameter stenosis).      < end of copied text >      EKG:  < from: 12 Lead ECG (06.08.18 @ 01:30) >    Ventricular Rate 92 BPM    Atrial Rate 92 BPM    P-R Interval 188 ms    QRS Duration 86 ms    Q-T Interval 332 ms    QTC Calculation(Bezet) 410 ms    P Axis 60 degrees    R Axis 5 degrees    T Axis 84 degrees    Diagnosis Line Normal sinus rhythm  Septal infarct , age undetermined  Nonspecific ST - T abnormalities  Confirmed by MD Issa Jennifer ([id number]) on 6/8/2018 4:25:42 PM    < end of copied text >      TTE / ARIES:  < from: Echo W Bubble w/o Doppler Complete (06.08.18 @ 13:45) >  Interpretation Summary  Normal left ventricular size and wall thickness. The left ventricular wall   motion is normal. The left ventricular ejection fraction is estimated to   be   60-65%  The left atrial size is normal. A patent foramen ovale is   present.   Right atrial size is normal.  The right ventricle is normal in size and   function.Calcified aortic valve. No aortic regurgitation noted.There is   Mild   to moderate aortic stenosis. The calculated aortic valve area using the   continuity equation is 1.4 cm2. The peak pressure gradient is 21 mmHg.   The   mean pressure gradient is 14 mmHg. The dimensionless index (ratio of   LVOTvelocity to aortic velocity) was calculated to be 0.39. The   calculated   stroke volume index is 35 cc/m2 (normal >35cc/m2).  There is mild mitral   regurgitation.There is mild tricuspid regurgitation.There is mild   pulmonary   hypertension. The pulmonary artery systolic pressure is estimated to be   40   mmHg.  There is no pulmonic stenosis.No aortic root dilatation.The   inferior   vena cava is normal in size (<2.1 cm) with normal inspiratory collapse   (>50%)   consistent with normal right atrial pressure.  There is no pericardial   effusion.    < end of copied text >      Cardiac Cath: pending

## 2018-06-11 NOTE — PROGRESS NOTE ADULT - SUBJECTIVE AND OBJECTIVE BOX
HPI:  84M hx HTN, BPH, stroke in 2013 with residual seizures, on ASA 81mg QD presents with witnessed seizures x2, CTH reveals left frontal IPH, no MLS. Per family, pt was on route home from dinner and had not yet taken his 10pm keppra. He had a witnessed seizure in the cab, was brought to ER for evaluation and keppra, no CTH performed at this time, then had repeat seizure episode after being discharged from ER. Pt remembers both episodes, denies LOC, tongue biting, but did experience incontinence. He reports mild left sided back pain currently, but no headache, blurry vision, dizziness, weakness or numbness. Family reports "whole body stiffened" and body/limbs were shaking. He is currently being followed by a neurologist in [place] who changed his keppra dose, but also has seen Dr. Levy in [facility]. ICH score 1, NIH score 0. (08 Jun 2018 04:27)    OVERNIGHT EVENTS:  Vital Signs Last 24 Hrs  T(C): 37.2 (11 Jun 2018 09:00), Max: 37.3 (10 Jose Manuel 2018 22:24)  T(F): 99 (11 Jun 2018 09:00), Max: 99.1 (10 Jose Manuel 2018 22:24)  HR: 90 (11 Jun 2018 08:43) (76 - 90)  BP: 134/81 (11 Jun 2018 08:43) (129/77 - 168/81)  BP(mean): 103 (11 Jun 2018 08:43) (98 - 115)  RR: 18 (11 Jun 2018 08:43) (16 - 18)  SpO2: 96% (11 Jun 2018 08:43) (92% - 96%)    I&O's Summary    10 Jose Manuel 2018 07:01  -  11 Jun 2018 07:00  --------------------------------------------------------  IN: 780 mL / OUT: 1550 mL / NET: -770 mL    11 Jun 2018 07:01  -  11 Jun 2018 10:03  --------------------------------------------------------  IN: 600 mL / OUT: 400 mL / NET: 200 mL        PHYSICAL EXAM:  Neurological:  Awake and alert  face symmetric  AxOx3  speech coherent  R pronator drift  5/5 motor x 4ext    Motor exam:         [] Upper extremity              Bi(c5)  WE(c6)  EE(c7)   FF(c8)                                                R         5/5        5/5        5/5       5/5                                               L          5/5        5/5        5/5       5/5         [] Lower extremeity          HF(l2)   KE(l3)    TA(l4)   EHL(l5)  GS(s1)                                                 R        5/5        5/5        5/5       5/5         5/5                                               L         5/5        5/5       5/5       5/5          5/5                                                        [] warm well perfused; capillary refill <3 seconds     Sensation: [] intact to light touch  [] decreased:       Cardiovascular:  Respiratory:  Gastrointestinal:  Genitourinary:  Extremities:  Incision/Wound:    TUBES/LINES:  [] Snow  [] Lumbar Drain  [] Wound Drains  [] Others      DIET:  [] NPO  [] Mechanical  [] Tube feeds    LABS:                        11.6   7.9   )-----------( 171      ( 11 Jun 2018 06:50 )             32.7     06-11    134<L>  |  99  |  10  ----------------------------<  103<H>  3.8   |  24  |  0.71    Ca    8.8      11 Jun 2018 06:50  Phos  2.6     06-11  Mg     1.8     06-11              CAPILLARY BLOOD GLUCOSE          Drug Levels: [] N/A    CSF Analysis: [] N/A      Allergies    No Known Allergies    Intolerances      MEDICATIONS:  Antibiotics:    Neuro:  acetaminophen   Tablet 650 milliGRAM(s) Oral every 6 hours PRN  acetaminophen   Tablet. 650 milliGRAM(s) Oral every 6 hours PRN  levETIRAcetam 1000 milliGRAM(s) Oral <User Schedule>  levETIRAcetam 1500 milliGRAM(s) Oral <User Schedule>  ondansetron Injectable 4 milliGRAM(s) IV Push every 6 hours PRN    Anticoagulation:    OTHER:  docusate sodium 100 milliGRAM(s) Oral three times a day  hydrALAZINE Injectable 10 milliGRAM(s) IV Push every 2 hours PRN  labetalol Injectable 10 milliGRAM(s) IV Push every 6 hours PRN  lisinopril 20 milliGRAM(s) Oral daily  metoprolol tartrate 12.5 milliGRAM(s) Oral two times a day  pantoprazole    Tablet 40 milliGRAM(s) Oral before breakfast  senna 2 Tablet(s) Oral at bedtime PRN  simvastatin 20 milliGRAM(s) Oral at bedtime  tamsulosin 0.4 milliGRAM(s) Oral at bedtime    IVF:  magnesium oxide 400 milliGRAM(s) Oral once  magnesium sulfate  IVPB 2 Gram(s) IV Intermittent once  multivitamin 1 Tablet(s) Oral daily  potassium chloride    Tablet ER 40 milliEquivalent(s) Oral once  sodium chloride 2 Gram(s) Oral every 8 hours    CULTURES:    RADIOLOGY & ADDITIONAL TESTS:      ASSESSMENT:  84y Male s/p Left frontal IPH    PLAN:  NEURO:    Monitor Neuro status  OT/PT  ARIES today  F/U MRI t-L spine  continue current medical regime  F/U Medical consult      Dispo: Discussed with attending

## 2018-06-11 NOTE — PROGRESS NOTE ADULT - SUBJECTIVE AND OBJECTIVE BOX
Chief Complaint/Reason for Consult: cv mgmt  INTERVAL HPI: oob comfortable, no cp sob palp, tele reviewed  	  MEDICATIONS:  hydrALAZINE Injectable 10 milliGRAM(s) IV Push every 2 hours PRN  labetalol Injectable 10 milliGRAM(s) IV Push every 6 hours PRN  lisinopril 20 milliGRAM(s) Oral daily  tamsulosin 0.4 milliGRAM(s) Oral at bedtime        acetaminophen   Tablet 650 milliGRAM(s) Oral every 6 hours PRN  acetaminophen   Tablet. 650 milliGRAM(s) Oral every 6 hours PRN  levETIRAcetam 1000 milliGRAM(s) Oral <User Schedule>  levETIRAcetam 1500 milliGRAM(s) Oral <User Schedule>  ondansetron Injectable 4 milliGRAM(s) IV Push every 6 hours PRN    docusate sodium 100 milliGRAM(s) Oral three times a day  pantoprazole    Tablet 40 milliGRAM(s) Oral before breakfast  senna 2 Tablet(s) Oral at bedtime PRN    simvastatin 20 milliGRAM(s) Oral at bedtime    magnesium sulfate  IVPB 2 Gram(s) IV Intermittent once  multivitamin 1 Tablet(s) Oral daily  sodium chloride 2 Gram(s) Oral every 8 hours      REVIEW OF SYSTEMS:  [x] As per HPI  CONSTITUTIONAL: No fever, weight loss, or fatigue  RESPIRATORY: No cough, wheezing, chills or hemoptysis; No Shortness of Breath  CARDIOVASCULAR: No chest pain, palpitations, dizziness, or leg swelling  GASTROINTESTINAL: No abdominal or epigastric pain. No nausea, vomiting, or hematemesis; No diarrhea or constipation. No melena or hematochezia.  MUSCULOSKELETAL: No joint pain or swelling; No muscle, back, or extremity pain  [x] All others negative	  [ ] Unable to obtain    PHYSICAL EXAM:  T(C): 37 (06-11-18 @ 05:05), Max: 37.3 (06-10-18 @ 22:24)  HR: 76 (06-11-18 @ 06:00) (76 - 92)  BP: 168/81 (06-11-18 @ 06:00) (129/77 - 168/81)  RR: 17 (06-11-18 @ 06:00) (16 - 18)  SpO2: 95% (06-11-18 @ 06:00) (92% - 96%)  Wt(kg): --  I&O's Summary    10 Jose Manuel 2018 07:01  -  11 Jun 2018 07:00  --------------------------------------------------------  IN: 780 mL / OUT: 1550 mL / NET: -770 mL          Appearance: Normal	  HEENT:   Normal oral mucosa  Cardiovascular: Normal S1 S2, No JVD, No murmurs, No edema  Respiratory: Lungs clear to auscultation	  Gastrointestinal:  Soft, Non-tender, + BS	  Extremities: Normal range of motion, No clubbing, cyanosis or edema  Vascular: Peripheral pulses palpable 2+ bilaterally    TELEMETRY: 	    ECG:   	  RADIOLOGY:   CXR:  CT:  US:    CARDIAC TESTING:  Echocardiogram:< from: Echo W Bubble w/o Doppler Complete (06.08.18 @ 13:45) >  Normal left ventricular size and wall thickness. The left ventricular wall   motion is normal. The left ventricular ejection fraction is estimated to   be   60-65%  The left atrial size is normal. A patent foramen ovale is   present.   Right atrial size is normal.  The right ventricle is normal in size and   function.Calcified aortic valve. No aortic regurgitation noted.There is   Mild   to moderate aortic stenosis. The calculated aortic valve area using the   continuity equation is 1.4 cm2. The peak pressure gradient is 21 mmHg.   The   mean pressure gradient is 14 mmHg. The dimensionless index (ratio of   LVOTvelocity to aortic velocity) was calculated to be 0.39. The   calculated   stroke volume index is 35 cc/m2 (normal >35cc/m2).  There is mild mitral   regurgitation.There is mild tricuspid regurgitation.There is mild   pulmonary   hypertension. The pulmonary artery systolic pressure is estimated to be   40   mmHg.  There is no pulmonic stenosis.No aortic root dilatation.The   inferior   vena cava is normal in size (<2.1 cm) with normal inspiratory collapse   (>50%)   consistent with normal right atrial pressure.  There is no pericardial   effusion.    < end of copied text >    Catheterization:  Stress Test:      LABS:	 	    CARDIAC MARKERS:                                  11.6   7.9   )-----------( 171      ( 11 Jun 2018 06:50 )             32.7     06-11    134<L>  |  99  |  10  ----------------------------<  103<H>  3.8   |  24  |  0.71    Ca    8.8      11 Jun 2018 06:50  Phos  2.6     06-11  Mg     1.8     06-11      proBNP:   Lipid Profile:   HgA1c:   TSH:     ASSESSMENT/PLAN: 	    # PAF - parox afib on monitor ChardsVasc >1 - benefit for AC however given hemorrhagice CVA will defer for now.  would recommend Lorperssor 12.5mg bid and titrate up as HR tolerates  EP recs appreciated    #PFO - f/u cerebral angiogram, if ischemic in nature will consider ARIES and structural heart work up (VERO Robles)  copplers neg afor dvt    #Mild AS - mild by gradients with preserved EF, again recommend standing lopressor 12.5mg po bid for now    #CV Prevention -   High dose statin should be initated

## 2018-06-11 NOTE — PROGRESS NOTE ADULT - ASSESSMENT
S/P brain hemorrhage.  Seizure due to above. Hypertension. Compression fractures.  History prostate ca.  Weakness and deconditioning  For discharge to rehab facility to be determined as patient wants to return to his home in [place].

## 2018-06-12 LAB
ANION GAP SERPL CALC-SCNC: 10 MMOL/L — SIGNIFICANT CHANGE UP (ref 5–17)
BUN SERPL-MCNC: 15 MG/DL — SIGNIFICANT CHANGE UP (ref 7–23)
CALCIUM SERPL-MCNC: 8.6 MG/DL — SIGNIFICANT CHANGE UP (ref 8.4–10.5)
CHLORIDE SERPL-SCNC: 101 MMOL/L — SIGNIFICANT CHANGE UP (ref 96–108)
CO2 SERPL-SCNC: 24 MMOL/L — SIGNIFICANT CHANGE UP (ref 22–31)
CREAT SERPL-MCNC: 0.72 MG/DL — SIGNIFICANT CHANGE UP (ref 0.5–1.3)
GLUCOSE SERPL-MCNC: 101 MG/DL — HIGH (ref 70–99)
HCT VFR BLD CALC: 29.7 % — LOW (ref 39–50)
HGB BLD-MCNC: 10.5 G/DL — LOW (ref 13–17)
MAGNESIUM SERPL-MCNC: 1.9 MG/DL — SIGNIFICANT CHANGE UP (ref 1.6–2.6)
MCHC RBC-ENTMCNC: 29.7 PG — SIGNIFICANT CHANGE UP (ref 27–34)
MCHC RBC-ENTMCNC: 35.4 G/DL — SIGNIFICANT CHANGE UP (ref 32–36)
MCV RBC AUTO: 83.9 FL — SIGNIFICANT CHANGE UP (ref 80–100)
PLATELET # BLD AUTO: 192 K/UL — SIGNIFICANT CHANGE UP (ref 150–400)
POTASSIUM SERPL-MCNC: 4.1 MMOL/L — SIGNIFICANT CHANGE UP (ref 3.5–5.3)
POTASSIUM SERPL-SCNC: 4.1 MMOL/L — SIGNIFICANT CHANGE UP (ref 3.5–5.3)
RBC # BLD: 3.54 M/UL — LOW (ref 4.2–5.8)
RBC # FLD: 12.8 % — SIGNIFICANT CHANGE UP (ref 10.3–16.9)
SODIUM SERPL-SCNC: 135 MMOL/L — SIGNIFICANT CHANGE UP (ref 135–145)
WBC # BLD: 6.3 K/UL — SIGNIFICANT CHANGE UP (ref 3.8–10.5)
WBC # FLD AUTO: 6.3 K/UL — SIGNIFICANT CHANGE UP (ref 3.8–10.5)

## 2018-06-12 PROCEDURE — 99232 SBSQ HOSP IP/OBS MODERATE 35: CPT

## 2018-06-12 PROCEDURE — 99232 SBSQ HOSP IP/OBS MODERATE 35: CPT | Mod: 24

## 2018-06-12 RX ORDER — ATORVASTATIN CALCIUM 80 MG/1
40 TABLET, FILM COATED ORAL AT BEDTIME
Qty: 0 | Refills: 0 | Status: DISCONTINUED | OUTPATIENT
Start: 2018-06-12 | End: 2018-06-13

## 2018-06-12 RX ORDER — LABETALOL HCL 100 MG
10 TABLET ORAL ONCE
Qty: 0 | Refills: 0 | Status: COMPLETED | OUTPATIENT
Start: 2018-06-12 | End: 2018-06-12

## 2018-06-12 RX ORDER — METOPROLOL TARTRATE 50 MG
25 TABLET ORAL
Qty: 0 | Refills: 0 | Status: DISCONTINUED | OUTPATIENT
Start: 2018-06-12 | End: 2018-06-13

## 2018-06-12 RX ADMIN — LEVETIRACETAM 1000 MILLIGRAM(S): 250 TABLET, FILM COATED ORAL at 06:47

## 2018-06-12 RX ADMIN — SODIUM CHLORIDE 2 GRAM(S): 9 INJECTION INTRAMUSCULAR; INTRAVENOUS; SUBCUTANEOUS at 01:36

## 2018-06-12 RX ADMIN — Medication 12.5 MILLIGRAM(S): at 06:50

## 2018-06-12 RX ADMIN — Medication 10 MILLIGRAM(S): at 17:28

## 2018-06-12 RX ADMIN — LEVETIRACETAM 1500 MILLIGRAM(S): 250 TABLET, FILM COATED ORAL at 17:23

## 2018-06-12 RX ADMIN — Medication 25 MILLIGRAM(S): at 17:23

## 2018-06-12 RX ADMIN — Medication 100 MILLIGRAM(S): at 13:15

## 2018-06-12 RX ADMIN — SODIUM CHLORIDE 2 GRAM(S): 9 INJECTION INTRAMUSCULAR; INTRAVENOUS; SUBCUTANEOUS at 10:28

## 2018-06-12 RX ADMIN — LISINOPRIL 20 MILLIGRAM(S): 2.5 TABLET ORAL at 06:47

## 2018-06-12 RX ADMIN — PANTOPRAZOLE SODIUM 40 MILLIGRAM(S): 20 TABLET, DELAYED RELEASE ORAL at 06:47

## 2018-06-12 RX ADMIN — TAMSULOSIN HYDROCHLORIDE 0.4 MILLIGRAM(S): 0.4 CAPSULE ORAL at 22:13

## 2018-06-12 RX ADMIN — SODIUM CHLORIDE 2 GRAM(S): 9 INJECTION INTRAMUSCULAR; INTRAVENOUS; SUBCUTANEOUS at 17:23

## 2018-06-12 RX ADMIN — Medication 100 MILLIGRAM(S): at 22:13

## 2018-06-12 RX ADMIN — Medication 10 MILLIGRAM(S): at 01:31

## 2018-06-12 RX ADMIN — ATORVASTATIN CALCIUM 40 MILLIGRAM(S): 80 TABLET, FILM COATED ORAL at 22:13

## 2018-06-12 RX ADMIN — Medication 1 TABLET(S): at 13:15

## 2018-06-12 NOTE — PROGRESS NOTE ADULT - ASSESSMENT
84M with  1.  L frontal intracerebral hemorrhage,  h/o L frontal cavernous malformation  2.  compression fractures T6 T7 L4 vertebral bodies  3.  seizure disorder  4.  h/o CVA  5.  Hypertension   6.  hyponatremia   7.  (+) PFO, mod AS    PLAN:   NEURO: neurochecks q4h, PRN pain meds with Tylenol  seizures:  continue home dose of levetiracetam   REHAB:  physical therapy evaluation and management    EARLY MOB:  ambulate with assist    PULM:  Room air, incentive spirometry  CARDIO:  SBP goal 100-150mm Hg, increase metoprolol to 25 BID; echo: EF 60-65%, (+) PFO,   ENDO:  Blood sugar goals 140-180 mg/dL, switch to Lipitor 40mg HS  GI:  PPI for GI prophylaxis  DIET: regular diet  RENAL:  IVL, continue tamsulosin   HEM/ONC: Hb stable  VTE Prophylaxis:  SCDs, no DVT chemoprophylaxis for now as patient is high risk for bleed (multiple ICH, ?cavernous malformation)  ID: afebrile, no leukocytosis  Social: daughter updated  DISPO planning: discharge to rehab    ATTENDING ATTESTATION:  I was physically present for the key portions of the evaluation and management (E/M) service provided.  I agree with the above history, physical and plan which I have reviewed and edited where appropriate.     Patient not at high risk for neurologic deterioration / death.  Time spent on this noncritically ill patient: 45 minutes spent on total encounter, more than 50% of the visit was spent counseling and/or coordinating care by the attending physician.    Plan discussed with RN, house staff.

## 2018-06-12 NOTE — CONSULT NOTE ADULT - SUBJECTIVE AND OBJECTIVE BOX
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 His keppra dose was increased. Echocardiogram was performed which shows evidence of a PFO. He denies any previous history of atrial fibrillation, arrhythmias, DVTs, PEs. He states that he was on coumadin many years ago for 2 weeks but cannot recall the reason. He denies any current chest pain, dyspnea, shortness of breath, palpitations, dizziness, lightheadedness. EPS is called because of non-sustained atrial arrhythmia noted on telemetry.          PAST MEDICAL AND SURGICAL HISTORY:  CVA (cerebral vascular accident)  Seizure  HTN (hypertension)      FAMILY HISTORY: denies cardiac issues     SOCIAL HISTORY:   Smoker:  Former, 4 pack years, quit 65 years ago  ETOH use:   NO      Ilicit Drug use:  NO  Occupation: Retired   Live with: Family    REVIEW OF SYSTEMS:    CONSTITUTIONAL: No weakness, fevers or chills  EYES/ENT: No visual changes;  No vertigo or throat pain   NECK: No pain or stiffness  RESPIRATORY: No cough, wheezing, hemoptysis; No shortness of breath  CARDIOVASCULAR: No chest pain or palpitations  GASTROINTESTINAL: No abdominal or epigastric pain. No nausea, vomiting, or hematemesis; No diarrhea or constipation. No melena or hematochezia.  GENITOURINARY: No dysuria, frequency or hematuria  NEUROLOGICAL: currently denies numbness or weakness. Seizure episodes prior to admission.   MUSCULOSKELETAL: denies joint pain / swelling.   SKIN: No itching, burning, rashes, or lesions   All other review of systems is negative unless indicated above.    ALLERGY:  No Known Allergies      INPATIENT MEDICATIONS:  acetaminophen   Tablet 650 milliGRAM(s) Oral every 6 hours PRN  acetaminophen   Tablet. 650 milliGRAM(s) Oral every 6 hours PRN  atorvastatin 40 milliGRAM(s) Oral at bedtime  docusate sodium 100 milliGRAM(s) Oral three times a day  hydrALAZINE Injectable 10 milliGRAM(s) IV Push every 2 hours PRN  labetalol Injectable 10 milliGRAM(s) IV Push every 6 hours PRN  levETIRAcetam 1000 milliGRAM(s) Oral <User Schedule>  levETIRAcetam 1500 milliGRAM(s) Oral <User Schedule>  lisinopril 20 milliGRAM(s) Oral daily  magnesium sulfate  IVPB 2 Gram(s) IV Intermittent once  metoprolol tartrate 25 milliGRAM(s) Oral two times a day  multivitamin 1 Tablet(s) Oral daily  ondansetron Injectable 4 milliGRAM(s) IV Push every 6 hours PRN  pantoprazole    Tablet 40 milliGRAM(s) Oral before breakfast  senna 2 Tablet(s) Oral at bedtime PRN  sodium chloride 2 Gram(s) Oral every 8 hours  tamsulosin 0.4 milliGRAM(s) Oral at bedtime      VITAL SIGNS:   T(C): 36.8 (18 @ 13:57), Max: 37 (18 @ 09:28)  HR: 64 (18 @ 12:24) (62 - 76)  BP: 148/79 (18 @ 12:24) (115/67 - 208/95)  RR: 18 (18 @ 12:24) (16 - 18)  SpO2: 97% (18 @ 12:24) (96% - 97%)  Wt(kg): --    PHYSICAL EXAM:   Appearance: NAD, sitting in chair  CVS: S1/S2 normal, RRR, no murmur  Pulm: CTA bilateral. no wheeze or rale  Abd:  +BS, Soft, NT/ND	  Ext: No LE edema  Neuro: AAO x 3. No focal deficit.         LABS:                        10.5   6.3   )-----------( 192      ( 2018 06:21 )             29.7     06-12    135  |  101  |  15  ----------------------------<  101<H>  4.1   |  24  |  0.72    Ca    8.6      2018 06:21  Phos  2.6     06-11  Mg     1.9     06-12    TSH: 1.03         EK18: NSR 92 bpm. Normal intervals. (; QRS 86; QTc 410 ms).     Telemetry: non-sustained short bursts of Atrial tach (these are ranging from a few beats to less than a minute).  Sinus HR range 70s.     Echo W Bubble w/o Doppler Complete (18 @ 13:45)  Normal left ventricular size and wall thickness. The left ventricular wall   motion is normal. The left ventricular ejection fraction is estimated to   be 60-65%  The left atrial size is normal. A patent foramen ovale is   present. Right atrial size is normal.  The right ventricle is normal in size and   function.Calcified aortic valve. No aortic regurgitation noted. There is   Mild to moderate aortic stenosis. The calculated aortic valve area using the   continuity equation is 1.4 cm2. The peak pressure gradient is 21 mmHg.   The mean pressure gradient is 14 mmHg. The dimensionless index (ratio of   LVOT velocity to aortic velocity) was calculated to be 0.39. The   calculated stroke volume index is 35 cc/m2 (normal >35cc/m2).  There is mild mitral   regurgitation.There is mild tricuspid regurgitation. There is mild   pulmonary hypertension. The pulmonary artery systolic pressure is estimated to be   40 mmHg.  There is no pulmonic stenosis.No aortic root dilatation.The   inferior vena cava is normal in size (<2.1 cm) with normal inspiratory collapse   (>50%) consistent with normal right atrial pressure.  There is no pericardial   effusion.

## 2018-06-12 NOTE — PROGRESS NOTE ADULT - SUBJECTIVE AND OBJECTIVE BOX
HPI:  84M hx HTN, BPH, stroke in 2013 with residual seizures, on ASA 81mg QD presents with witnessed seizures x2, CTH reveals left frontal IPH, no MLS. Per family, pt was on route home from dinner and had not yet taken his 10pm keppra. He had a witnessed seizure in the cab, was brought to ER for evaluation and keppra, no CTH performed at this time, then had repeat seizure episode after being discharged from ER. Pt remembers both episodes, denies LOC, tongue biting, but did experience incontinence. He reports mild left sided back pain currently, but no headache, blurry vision, dizziness, weakness or numbness. Family reports "whole body stiffened" and body/limbs were shaking. He is currently being followed by a neurologist in [place] who changed his keppra dose, but also has seen Dr. Levy in [facility]. ICH score 1, NIH score 0. (08 Jun 2018 04:27)    OVERNIGHT EVENTS:  Vital Signs Last 24 Hrs  T(C): 36.8 (12 Jun 2018 05:05), Max: 37.2 (11 Jun 2018 09:00)  T(F): 98.3 (12 Jun 2018 05:05), Max: 99 (11 Jun 2018 09:00)  HR: 66 (12 Jun 2018 08:30) (62 - 84)  BP: 172/84 (12 Jun 2018 08:30) (115/67 - 208/95)  BP(mean): 121 (12 Jun 2018 08:30) (90 - 136)  RR: 18 (12 Jun 2018 08:30) (16 - 18)  SpO2: 97% (12 Jun 2018 08:30) (96% - 99%)    I&O's Summary    11 Jun 2018 07:01  -  12 Jun 2018 07:00  --------------------------------------------------------  IN: 1500 mL / OUT: 1250 mL / NET: 250 mL        PHYSICAL EXAM:  Neurological:  PHYSICAL EXAM:  Neurological:  Awake and alert  face symmetric  AxOx3  speech coherent  R pronator drift improving  5/5 motor x 4ext    Cardiovascular: RRR  Respiratory: Lungs CTAB  Gastrointestinal: +BS  Genitourinary: voiding without difficulty  Extremities: warm and dry    DIET: Regular    LABS:                        10.5   6.3   )-----------( 192      ( 12 Jun 2018 06:21 )             29.7     06-12    135  |  101  |  15  ----------------------------<  101<H>  4.1   |  24  |  0.72    Ca    8.6      12 Jun 2018 06:21  Phos  2.6     06-11  Mg     1.9     06-12      CAPILLARY BLOOD GLUCOSE      Drug Levels: [] N/A    CSF Analysis: [] N/A      Allergies    No Known Allergies    Intolerances      MEDICATIONS:  Antibiotics:    Neuro:  acetaminophen   Tablet 650 milliGRAM(s) Oral every 6 hours PRN  acetaminophen   Tablet. 650 milliGRAM(s) Oral every 6 hours PRN  levETIRAcetam 1000 milliGRAM(s) Oral <User Schedule>  levETIRAcetam 1500 milliGRAM(s) Oral <User Schedule>  ondansetron Injectable 4 milliGRAM(s) IV Push every 6 hours PRN    Anticoagulation:    OTHER:  docusate sodium 100 milliGRAM(s) Oral three times a day  hydrALAZINE Injectable 10 milliGRAM(s) IV Push every 2 hours PRN  labetalol Injectable 10 milliGRAM(s) IV Push every 6 hours PRN  lisinopril 20 milliGRAM(s) Oral daily  metoprolol tartrate 12.5 milliGRAM(s) Oral two times a day  pantoprazole    Tablet 40 milliGRAM(s) Oral before breakfast  senna 2 Tablet(s) Oral at bedtime PRN  simvastatin 20 milliGRAM(s) Oral at bedtime  tamsulosin 0.4 milliGRAM(s) Oral at bedtime    IVF:  magnesium sulfate  IVPB 2 Gram(s) IV Intermittent once  multivitamin 1 Tablet(s) Oral daily  sodium chloride 2 Gram(s) Oral every 8 hours      ASSESSMENT:  84y Male s/p Left frontal IPH    PLAN:  NEURO:    Monitor neuro status  OT?PT  f/U 1 month ARIES  Continue current medical regime      Dispo: will discuss with attending

## 2018-06-12 NOTE — PROVIDER CONTACT NOTE (OTHER) - ASSESSMENT
Pt alert and oriented x4, neuro checks remain at baseline, Pt just awoke from sleep, and denies any symptoms.

## 2018-06-12 NOTE — CONSULT NOTE ADULT - ASSESSMENT
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 His keppra dose was increased. Echo was performed which shows evidence of a PFO. Normal LVEF.  Telemetry with non-sustained atrial tach.   - No need for anticoagulation as we haven't observed AF/Flutter on telemetry.   - case d/w DR. 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 His keppra dose was increased. Echo was performed which shows evidence of a PFO. Normal LVEF.    - No evidence of AF/Flutter on telemetry. Telemetry with non-sustained atrial tach.   - case d/w DR. Ribeiro

## 2018-06-12 NOTE — PROGRESS NOTE ADULT - SUBJECTIVE AND OBJECTIVE BOX
=================================  NEUROCRITICAL CARE ATTENDING NOTE  =================================    THOMAS ROBB   MRN-[id number]  Summary:  84M with h/o stroke (), seizures.  now presents with 2 episodes of seizures.  Brought to ED where CT showed L frontal ICH.  Overnight Events: No significant events overnight.  REVIEW OF SYSTEMS:  No headaches, no nausea or vomiting; 14 -point review of systems otherwise unremarkable.    Past Medical History: CVA (cerebral vascular accident) Seizure HTN (hypertension)  Allergies:  No Known Allergies  Home meds: ?    PHYSICAL EXAMINATION  T(C): 37 ( @ 09:28), Max: 37.2 ( @ 14:30) HR: 64 ( @ 12:24) (62 - 76) BP: 148/79 ( @ 12:24) (115/67 - 208/95) RR: 18 ( @ 12:24) (16 - 18) SpO2: 97% ( @ 12:24) (96% - 97%)  NEUROLOGIC EXAMINATION:  Patient is awake, alert, fully oriented, pupils 2-3mm equal and briskly reactive to light, EOMs intact, moves all 4s with good strength  GENERAL:  not intubated, not in cardiorespiratory distress  EENT: anicteric  CARDIOVASC:  (+) S1 S2, normal rate and regular rhythm  PULMONARY:  clear to auscultation bilaterally  ABDOMEN:  soft, nontender, with normoactive bowel sounds  EXTREMITIES:  no edema  SKIN:  no rash    LABS:             10.5   6.3   )-----------( 192      ( 2018 06:21 )             29.7     135  |  101  |  15  ----------------------------<  101<H>  4.1   |  24  |  0.72    Ca    8.6      2018 06:21  Phos  2.6     06-11  Mg     1.9         HbA1C = 4.9 ()  LDL = 89 ()   HDL = 72 ()  TG = 69 ()   TSH = 1.030 ()    11 @ 07:01  -   @ 07:00  IN: 1500 mL / OUT: 1250 mL / NET: 250 mL    Bacteriology:  CSF studies:  EEG:  Neuroimagin/10 MR spine:  compression deformity L4, benign compression fracture related to osteopenia   MRI: early subacute L frontal hematoma with surrounding edema, consider amyloid angiopathy, no MR evidence of intracranial mass, SVID   CT head:  ICH L frontal, no MLS   CTA head:  markedly tortuous R and L ICA, no significant stenosis  Other imagin/08 CT chest / abd / pelvis:  abnormally dilated appendix ?mucocele, calcified aortic valve, pulmo edema, large and small airway disease / bronchiectasis; trace bilateral pleural effusion; compression fractures T6 T7 L4 vertebral bodies, central lucency in L4 ?pathologic fracture; radiation seed implants in prostate, 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  06/10 Doppler: NEG    MEDICATIONS: docusate levetiracetam 1G/1.5G lisinopril 20mg daily metoprolol 12.5 mg BID MVI daily pantoprazole 40 daily senna 2mg HS PRN simvastatin 20mg HS salt 2g q8h tamsulosin 0.4mg HS    IV FLUIDS: IVL  DRIPS:  DIET: regular diet  Lines:  Wounds:    CODE STATUS:  Full Code                       GOALS OF CARE:  aggressive                      DISPOSITION:  8La  ICH Score on admission  = Age >=80 = 1        Estimated 30-day mortality 1 point: 13%

## 2018-06-12 NOTE — PROGRESS NOTE ADULT - SUBJECTIVE AND OBJECTIVE BOX
Chief Complaint/Reason for Consult: pfo paf  INTERVAL HPI: oob chair no palp no syncope tele with parox afib   	  MEDICATIONS:  hydrALAZINE Injectable 10 milliGRAM(s) IV Push every 2 hours PRN  labetalol Injectable 10 milliGRAM(s) IV Push every 6 hours PRN  lisinopril 20 milliGRAM(s) Oral daily  metoprolol tartrate 12.5 milliGRAM(s) Oral two times a day  tamsulosin 0.4 milliGRAM(s) Oral at bedtime        acetaminophen   Tablet 650 milliGRAM(s) Oral every 6 hours PRN  acetaminophen   Tablet. 650 milliGRAM(s) Oral every 6 hours PRN  levETIRAcetam 1000 milliGRAM(s) Oral <User Schedule>  levETIRAcetam 1500 milliGRAM(s) Oral <User Schedule>  ondansetron Injectable 4 milliGRAM(s) IV Push every 6 hours PRN    docusate sodium 100 milliGRAM(s) Oral three times a day  pantoprazole    Tablet 40 milliGRAM(s) Oral before breakfast  senna 2 Tablet(s) Oral at bedtime PRN    simvastatin 20 milliGRAM(s) Oral at bedtime    magnesium sulfate  IVPB 2 Gram(s) IV Intermittent once  multivitamin 1 Tablet(s) Oral daily  sodium chloride 2 Gram(s) Oral every 8 hours      REVIEW OF SYSTEMS:  [x] As per HPI  CONSTITUTIONAL: No fever, weight loss, or fatigue  RESPIRATORY: No cough, wheezing, chills or hemoptysis; No Shortness of Breath  CARDIOVASCULAR: No chest pain, palpitations, dizziness, or leg swelling  GASTROINTESTINAL: No abdominal or epigastric pain. No nausea, vomiting, or hematemesis; No diarrhea or constipation. No melena or hematochezia.  MUSCULOSKELETAL: No joint pain or swelling; No muscle, back, or extremity pain  [x] All others negative	  [ ] Unable to obtain    PHYSICAL EXAM:  T(C): 37 (06-12-18 @ 09:28), Max: 37.2 (06-11-18 @ 14:30)  HR: 74 (06-12-18 @ 10:55) (62 - 76)  BP: 167/88 (06-12-18 @ 10:55) (115/67 - 208/95)  RR: 18 (06-12-18 @ 08:30) (16 - 18)  SpO2: 97% (06-12-18 @ 08:30) (96% - 97%)  Wt(kg): --  I&O's Summary    11 Jun 2018 07:01  -  12 Jun 2018 07:00  --------------------------------------------------------  IN: 1500 mL / OUT: 1250 mL / NET: 250 mL    12 Jun 2018 07:01  -  12 Jun 2018 12:18  --------------------------------------------------------  IN: 280 mL / OUT: 175 mL / NET: 105 mL      Height (cm): 180.34 (06-12 @ 04:33)  Weight (kg): 75.6 (06-12 @ 04:33)  BMI (kg/m2): 23.2 (06-12 @ 04:33)  BSA (m2): 1.95 (06-12 @ 04:33)    Appearance: Normal	  HEENT:   Normal oral mucosa  Cardiovascular: Normal S1 S2, No JVD, No murmurs, No edema  Respiratory: Lungs clear to auscultation	  Gastrointestinal:  Soft, Non-tender, + BS	  Extremities: Normal range of motion, No clubbing, cyanosis or edema  Vascular: Peripheral pulses palpable 2+ bilaterally    TELEMETRY: 	    ECG:   	  RADIOLOGY:   CXR:  CT:  US:    CARDIAC TESTING:  Echocardiogram:  Catheterization:  Stress Test:      LABS:	 	    CARDIAC MARKERS:                                  10.5   6.3   )-----------( 192      ( 12 Jun 2018 06:21 )             29.7     06-12    135  |  101  |  15  ----------------------------<  101<H>  4.1   |  24  |  0.72    Ca    8.6      12 Jun 2018 06:21  Phos  2.6     06-11  Mg     1.9     06-12      proBNP:   Lipid Profile:   HgA1c:   TSH:     ASSESSMENT/PLAN: 	    # paf - increase metoprolol to 25bid, no AC given ICH  EP recs aprpeciated    #PFO - knwon as per pt and outpt cardio Dr Cesar, SHDEEPIKA recs appreciated    #Mild AS - mild by gradients with preserved EF, again recommend lopressor 25mg po bid     #CV Prevention -   High dose statin should be initated

## 2018-06-12 NOTE — PROVIDER CONTACT NOTE (OTHER) - ACTION/TREATMENT ORDERED:
LIYA Ventura notified. No further orders given. Will continue to monitor.
PRN labetolol 10mg IV push.

## 2018-06-13 VITALS — TEMPERATURE: 99 F

## 2018-06-13 LAB
ANION GAP SERPL CALC-SCNC: 12 MMOL/L — SIGNIFICANT CHANGE UP (ref 5–17)
BUN SERPL-MCNC: 13 MG/DL — SIGNIFICANT CHANGE UP (ref 7–23)
CALCIUM SERPL-MCNC: 8.6 MG/DL — SIGNIFICANT CHANGE UP (ref 8.4–10.5)
CHLORIDE SERPL-SCNC: 100 MMOL/L — SIGNIFICANT CHANGE UP (ref 96–108)
CO2 SERPL-SCNC: 23 MMOL/L — SIGNIFICANT CHANGE UP (ref 22–31)
CREAT SERPL-MCNC: 0.68 MG/DL — SIGNIFICANT CHANGE UP (ref 0.5–1.3)
GLUCOSE SERPL-MCNC: 102 MG/DL — HIGH (ref 70–99)
HCT VFR BLD CALC: 30 % — LOW (ref 39–50)
HGB BLD-MCNC: 10.4 G/DL — LOW (ref 13–17)
MAGNESIUM SERPL-MCNC: 1.7 MG/DL — SIGNIFICANT CHANGE UP (ref 1.6–2.6)
MCHC RBC-ENTMCNC: 29.8 PG — SIGNIFICANT CHANGE UP (ref 27–34)
MCHC RBC-ENTMCNC: 34.7 G/DL — SIGNIFICANT CHANGE UP (ref 32–36)
MCV RBC AUTO: 86 FL — SIGNIFICANT CHANGE UP (ref 80–100)
PHOSPHATE SERPL-MCNC: 3.8 MG/DL — SIGNIFICANT CHANGE UP (ref 2.5–4.5)
PLATELET # BLD AUTO: 214 K/UL — SIGNIFICANT CHANGE UP (ref 150–400)
POTASSIUM SERPL-MCNC: 4 MMOL/L — SIGNIFICANT CHANGE UP (ref 3.5–5.3)
POTASSIUM SERPL-SCNC: 4 MMOL/L — SIGNIFICANT CHANGE UP (ref 3.5–5.3)
RBC # BLD: 3.49 M/UL — LOW (ref 4.2–5.8)
RBC # FLD: 12.3 % — SIGNIFICANT CHANGE UP (ref 10.3–16.9)
SODIUM SERPL-SCNC: 135 MMOL/L — SIGNIFICANT CHANGE UP (ref 135–145)
WBC # BLD: 6.2 K/UL — SIGNIFICANT CHANGE UP (ref 3.8–10.5)
WBC # FLD AUTO: 6.2 K/UL — SIGNIFICANT CHANGE UP (ref 3.8–10.5)

## 2018-06-13 PROCEDURE — 85027 COMPLETE CBC AUTOMATED: CPT

## 2018-06-13 PROCEDURE — 85610 PROTHROMBIN TIME: CPT

## 2018-06-13 PROCEDURE — 99291 CRITICAL CARE FIRST HOUR: CPT | Mod: 25

## 2018-06-13 PROCEDURE — 84443 ASSAY THYROID STIM HORMONE: CPT

## 2018-06-13 PROCEDURE — 70553 MRI BRAIN STEM W/O & W/DYE: CPT

## 2018-06-13 PROCEDURE — 36430 TRANSFUSION BLD/BLD COMPNT: CPT

## 2018-06-13 PROCEDURE — 95951: CPT

## 2018-06-13 PROCEDURE — 80061 LIPID PANEL: CPT

## 2018-06-13 PROCEDURE — 70496 CT ANGIOGRAPHY HEAD: CPT

## 2018-06-13 PROCEDURE — 86900 BLOOD TYPING SEROLOGIC ABO: CPT

## 2018-06-13 PROCEDURE — 97161 PT EVAL LOW COMPLEX 20 MIN: CPT

## 2018-06-13 PROCEDURE — 85025 COMPLETE CBC W/AUTO DIFF WBC: CPT

## 2018-06-13 PROCEDURE — 81003 URINALYSIS AUTO W/O SCOPE: CPT

## 2018-06-13 PROCEDURE — A9585: CPT

## 2018-06-13 PROCEDURE — 71260 CT THORAX DX C+: CPT

## 2018-06-13 PROCEDURE — 93307 TTE W/O DOPPLER COMPLETE: CPT

## 2018-06-13 PROCEDURE — 74177 CT ABD & PELVIS W/CONTRAST: CPT

## 2018-06-13 PROCEDURE — 72157 MRI CHEST SPINE W/O & W/DYE: CPT

## 2018-06-13 PROCEDURE — 86850 RBC ANTIBODY SCREEN: CPT

## 2018-06-13 PROCEDURE — 85730 THROMBOPLASTIN TIME PARTIAL: CPT

## 2018-06-13 PROCEDURE — 83735 ASSAY OF MAGNESIUM: CPT

## 2018-06-13 PROCEDURE — 72158 MRI LUMBAR SPINE W/O & W/DYE: CPT

## 2018-06-13 PROCEDURE — 97116 GAIT TRAINING THERAPY: CPT

## 2018-06-13 PROCEDURE — P9035: CPT

## 2018-06-13 PROCEDURE — 93970 EXTREMITY STUDY: CPT

## 2018-06-13 PROCEDURE — 82962 GLUCOSE BLOOD TEST: CPT

## 2018-06-13 PROCEDURE — 70450 CT HEAD/BRAIN W/O DYE: CPT

## 2018-06-13 PROCEDURE — 36415 COLL VENOUS BLD VENIPUNCTURE: CPT

## 2018-06-13 PROCEDURE — 72100 X-RAY EXAM L-S SPINE 2/3 VWS: CPT

## 2018-06-13 PROCEDURE — 80307 DRUG TEST PRSMV CHEM ANLYZR: CPT

## 2018-06-13 PROCEDURE — 93005 ELECTROCARDIOGRAM TRACING: CPT

## 2018-06-13 PROCEDURE — 80053 COMPREHEN METABOLIC PANEL: CPT

## 2018-06-13 PROCEDURE — 80048 BASIC METABOLIC PNL TOTAL CA: CPT

## 2018-06-13 PROCEDURE — 86901 BLOOD TYPING SEROLOGIC RH(D): CPT

## 2018-06-13 PROCEDURE — 83036 HEMOGLOBIN GLYCOSYLATED A1C: CPT

## 2018-06-13 PROCEDURE — 84100 ASSAY OF PHOSPHORUS: CPT

## 2018-06-13 PROCEDURE — 71045 X-RAY EXAM CHEST 1 VIEW: CPT

## 2018-06-13 RX ORDER — HEPARIN SODIUM 5000 [USP'U]/ML
5000 INJECTION INTRAVENOUS; SUBCUTANEOUS EVERY 8 HOURS
Qty: 0 | Refills: 0 | Status: DISCONTINUED | OUTPATIENT
Start: 2018-06-13 | End: 2018-06-13

## 2018-06-13 RX ORDER — SENNA PLUS 8.6 MG/1
2 TABLET ORAL
Qty: 0 | Refills: 0 | COMMUNITY
Start: 2018-06-13

## 2018-06-13 RX ORDER — DOCUSATE SODIUM 100 MG
1 CAPSULE ORAL
Qty: 0 | Refills: 0 | COMMUNITY
Start: 2018-06-13

## 2018-06-13 RX ORDER — ACETAMINOPHEN 500 MG
2 TABLET ORAL
Qty: 0 | Refills: 0 | COMMUNITY
Start: 2018-06-13

## 2018-06-13 RX ORDER — ATORVASTATIN CALCIUM 80 MG/1
1 TABLET, FILM COATED ORAL
Qty: 0 | Refills: 0 | COMMUNITY
Start: 2018-06-13

## 2018-06-13 RX ORDER — TAMSULOSIN HYDROCHLORIDE 0.4 MG/1
1 CAPSULE ORAL
Qty: 0 | Refills: 0 | COMMUNITY
Start: 2018-06-13

## 2018-06-13 RX ORDER — PANTOPRAZOLE SODIUM 20 MG/1
1 TABLET, DELAYED RELEASE ORAL
Qty: 0 | Refills: 0 | COMMUNITY
Start: 2018-06-13

## 2018-06-13 RX ORDER — LEVETIRACETAM 250 MG/1
0 TABLET, FILM COATED ORAL
Qty: 0 | Refills: 0 | COMMUNITY

## 2018-06-13 RX ORDER — HEPARIN SODIUM 5000 [USP'U]/ML
5000 INJECTION INTRAVENOUS; SUBCUTANEOUS
Qty: 0 | Refills: 0 | COMMUNITY
Start: 2018-06-13

## 2018-06-13 RX ORDER — SODIUM CHLORIDE 9 MG/ML
2 INJECTION INTRAMUSCULAR; INTRAVENOUS; SUBCUTANEOUS
Qty: 0 | Refills: 0 | COMMUNITY
Start: 2018-06-13

## 2018-06-13 RX ORDER — LEVETIRACETAM 250 MG/1
2 TABLET, FILM COATED ORAL
Qty: 0 | Refills: 0 | COMMUNITY
Start: 2018-06-13

## 2018-06-13 RX ORDER — LISINOPRIL 2.5 MG/1
20 TABLET ORAL
Qty: 0 | Refills: 0 | COMMUNITY

## 2018-06-13 RX ORDER — ASPIRIN/CALCIUM CARB/MAGNESIUM 324 MG
1 TABLET ORAL
Qty: 0 | Refills: 0 | COMMUNITY

## 2018-06-13 RX ORDER — LEVETIRACETAM 250 MG/1
1 TABLET, FILM COATED ORAL
Qty: 0 | Refills: 0 | COMMUNITY
Start: 2018-06-13

## 2018-06-13 RX ORDER — TAMSULOSIN HYDROCHLORIDE 0.4 MG/1
1 CAPSULE ORAL
Qty: 0 | Refills: 0 | COMMUNITY

## 2018-06-13 RX ORDER — LISINOPRIL 2.5 MG/1
1 TABLET ORAL
Qty: 0 | Refills: 0 | COMMUNITY
Start: 2018-06-13

## 2018-06-13 RX ORDER — SIMVASTATIN 20 MG/1
1 TABLET, FILM COATED ORAL
Qty: 0 | Refills: 0 | COMMUNITY

## 2018-06-13 RX ADMIN — Medication 1 TABLET(S): at 11:10

## 2018-06-13 RX ADMIN — Medication 25 MILLIGRAM(S): at 05:35

## 2018-06-13 RX ADMIN — SODIUM CHLORIDE 2 GRAM(S): 9 INJECTION INTRAMUSCULAR; INTRAVENOUS; SUBCUTANEOUS at 01:00

## 2018-06-13 RX ADMIN — SODIUM CHLORIDE 2 GRAM(S): 9 INJECTION INTRAMUSCULAR; INTRAVENOUS; SUBCUTANEOUS at 10:21

## 2018-06-13 RX ADMIN — LISINOPRIL 20 MILLIGRAM(S): 2.5 TABLET ORAL at 05:35

## 2018-06-13 RX ADMIN — PANTOPRAZOLE SODIUM 40 MILLIGRAM(S): 20 TABLET, DELAYED RELEASE ORAL at 06:06

## 2018-06-13 RX ADMIN — LEVETIRACETAM 1000 MILLIGRAM(S): 250 TABLET, FILM COATED ORAL at 05:35

## 2018-06-13 NOTE — DISCHARGE NOTE ADULT - CARE PROVIDER_API CALL
Jeff Perrin), Neurosurgery  [address] [address]  Phone: [phone]  Fax: [phone]    Cristóbal Cesar (MD), Cardiovascular Disease; Internal Medicine  [address] [place] [id number]  Phone: [phone]  Fax: [phone]

## 2018-06-13 NOTE — DISCHARGE NOTE ADULT - NS AS ACTIVITY OBS
Stairs allowed/No Heavy lifting/straining/Walking-Outdoors allowed/Bathing allowed/Do not make important decisions/Do not drive or operate machinery/Walking-Indoors allowed/Showering allowed

## 2018-06-13 NOTE — PROGRESS NOTE ADULT - SUBJECTIVE AND OBJECTIVE BOX
HPI:  84M hx HTN, BPH, stroke in 2013 with residual seizures, on ASA 81mg QD presents with witnessed seizures x2, CTH reveals left frontal IPH, no MLS. Per family, pt was on route home from dinner and had not yet taken his 10pm keppra. He had a witnessed seizure in the cab, was brought to ER for evaluation and keppra, no CTH performed at this time, then had repeat seizure episode after being discharged from ER. Pt remembers both episodes, denies LOC, tongue biting, but did experience incontinence. He reports mild left sided back pain currently, but no headache, blurry vision, dizziness, weakness or numbness. Family reports "whole body stiffened" and body/limbs were shaking. He is currently being followed by a neurologist in [place] who changed his keppra dose, but also has seen Dr. Levy in [facility]. ICH score 1, NIH score 0. (08 Jun 2018 04:27)    OVERNIGHT EVENTS:  No new complaints.     Vital Signs Last 24 Hrs  T(C): 37.1 (13 Jun 2018 09:17), Max: 37.2 (12 Jun 2018 16:12)  T(F): 98.8 (13 Jun 2018 09:17), Max: 98.9 (12 Jun 2018 16:12)  HR: 62 (13 Jun 2018 08:26) (62 - 74)  BP: 142/73 (13 Jun 2018 08:26) (142/73 - 176/86)  BP(mean): 100 (13 Jun 2018 08:26) (100 - 123)  RR: 17 (13 Jun 2018 08:26) (16 - 28)  SpO2: 95% (13 Jun 2018 08:26) (94% - 98%)    I&O's Summary    12 Jun 2018 07:01  -  13 Jun 2018 07:00  --------------------------------------------------------  IN: 560 mL / OUT: 1150 mL / NET: -590 mL    13 Jun 2018 07:01  -  13 Jun 2018 09:29  --------------------------------------------------------  IN: 0 mL / OUT: 150 mL / NET: -150 mL        PHYSICAL EXAM:  Neurological:  AxOx3  FC  face symmetric  LUTZ   R pronator drift otherwise 5/5 motor  Incision/Wound:    TUBES/LINES:  [] Snow  [] Lumbar Drain  [] Wound Drains  [] Others      DIET:  [] NPO  [x] Mechanical  [] Tube feeds    LABS:                        10.4   6.2   )-----------( 214      ( 13 Jun 2018 06:22 )             30.0     06-13    135  |  100  |  13  ----------------------------<  102<H>  4.0   |  23  |  0.68    Ca    8.6      13 Jun 2018 06:22  Phos  3.8     06-13  Mg     1.7     06-13              CAPILLARY BLOOD GLUCOSE          Drug Levels: [] N/A    CSF Analysis: [] N/A      Allergies    No Known Allergies    Intolerances      MEDICATIONS:  Antibiotics:    Neuro:  acetaminophen   Tablet 650 milliGRAM(s) Oral every 6 hours PRN  acetaminophen   Tablet. 650 milliGRAM(s) Oral every 6 hours PRN  levETIRAcetam 1000 milliGRAM(s) Oral <User Schedule>  levETIRAcetam 1500 milliGRAM(s) Oral <User Schedule>  ondansetron Injectable 4 milliGRAM(s) IV Push every 6 hours PRN    Anticoagulation:  heparin  Injectable 5000 Unit(s) SubCutaneous every 8 hours    OTHER:  atorvastatin 40 milliGRAM(s) Oral at bedtime  docusate sodium 100 milliGRAM(s) Oral three times a day  hydrALAZINE Injectable 10 milliGRAM(s) IV Push every 2 hours PRN  labetalol Injectable 10 milliGRAM(s) IV Push every 6 hours PRN  lisinopril 20 milliGRAM(s) Oral daily  metoprolol tartrate 25 milliGRAM(s) Oral two times a day  pantoprazole    Tablet 40 milliGRAM(s) Oral before breakfast  senna 2 Tablet(s) Oral at bedtime PRN  tamsulosin 0.4 milliGRAM(s) Oral at bedtime    IVF:  magnesium sulfate  IVPB 2 Gram(s) IV Intermittent once  multivitamin 1 Tablet(s) Oral daily  sodium chloride 2 Gram(s) Oral every 8 hours    CULTURES:    RADIOLOGY & ADDITIONAL TESTS:  < from: MR Lumbar Spine w/wo IV Cont (06.10.18 @ 16:29) >  1. Compression deformity of L4 with about 25-50% loss of vertebral body   height with no retropulsion into spinal canal and no extension of   horizontal edema into the pedicles bilaterally favoring benign   compression fracture related to osteopenia with predominant horizontal   enhancement and nonenhancement centrally within L4. Although benign   etiology is favored, if there remains concern for osseous metastasis,   consideration could be given to bone scan and/or PET CT for further   characterization.  2. Linear edema along superior aspect of L2 likely early benign   compression deformity on the basis of osteopenia.  3. 2 mm nodule of enhancement within the dorsal lateral left aspect of   the spinal canal at the level of the conus adherent to the cord.   Differential includes benign nerve sheath tumor such as neurofibroma or   schwannoma. Other etiologies are not excluded. Repeat lumbar spine MR   with contrast in 3-6 months may be helpful for further characterization.  4. Diffuse disc bulges at all levels of the lumbar spine with tiny   central disc protrusion along cephalad aspect of L4-L5 disc space   compartment.  5. Mild central canal stenosis L2-L3 and L3-4 levels related to acquired   etiologies from facet arthrosis and disc disease.  6. Multilevel foraminal narrowing due to facet arthrosis, as above.    < end of copied text >      ASSESSMENT:  84y Male w/IPH    INTRACRANIAL HEMORRHAGE  Handoff  MEWS Score  CVA (cerebral vascular accident)  Seizure  HTN (hypertension)  Intracranial hemorrhage  Preoperative clearance  Pulmonary nodule  Bronchiectasis  Pulmonary hypertension  PFO (patent foramen ovale)  Aortic stenosis  HTN (hypertension)  Seizure  CVA (cerebral vascular accident)  Intracranial hemorrhage  SEIZURE  0      PLAN:  NEURO:  -cardiology input appreciated. Metoprolol increased and statin started. Pt to f/u as outpt for ARIES for PFO  -start SQH for dvt prophylaxis  -con't keppra  -pt needs repeat MRI brain in 1 month   -ok to restart aspririn 2 weeks post bleed as per   -awaiting AR today  -D/W     DVT PROPHYLAXIS:  [x] Venodynes                                [x] Heparin  DISPOSITION:AR

## 2018-06-13 NOTE — DISCHARGE NOTE ADULT - PLAN OF CARE
improve daily function -pt may restart aspirin 2 weeks after hemorrhage (6/22)  -pt should follow up with cardiologist  after rehab for ARIES. PFO was found on heart echo.  Pt was started on lipitor and metoprolol.  -pt needs to have repeat MRI brain in 1 month with and without contrast.          Inform the doctor immediately if you have a fever (above 101), chills, night  sweats, wound drainage, increasing wound redness or pain, nausea, vomiting, or  worsening headache.  B. ACTIVITY LEVEL  1. Fatigue is common following brain surgery, rest if you are tired.  2. You should get up and walk around every hour during the daytime.  -call MD if temp>101, seizure, worsening weakness in extremities or mental status change  - Drink plenty of water, stay out of the sun.   Avoid use of alcohol.

## 2018-06-13 NOTE — DISCHARGE NOTE ADULT - MEDICATION SUMMARY - MEDICATIONS TO TAKE
I will START or STAY ON the medications listed below when I get home from the hospital:    acetaminophen 325 mg oral tablet  -- 2 tab(s) by mouth every 6 hours, As needed, For Temp greater than 38 C (100.4 F)  -- Indication: For fever    acetaminophen 325 mg oral tablet  -- 2 tab(s) by mouth every 6 hours, As needed, Mild Pain (1 - 3)  -- Indication: For Pain    lisinopril 20 mg oral tablet  -- 1 tab(s) by mouth once a day  -- Indication: For HTN (hypertension)    tamsulosin 0.4 mg oral capsule  -- 1 cap(s) by mouth once a day (at bedtime)  -- Indication: For Bph    heparin  -- 5000 unit(s) subcutaneous every 8 hours  -- Indication: For dvt prophylaxis    levETIRAcetam 1000 mg oral tablet  -- 1 tab(s) by mouth   -- Indication: For Seizure    levETIRAcetam 750 mg oral tablet  -- 2 tab(s) by mouth   -- Indication: For Seizure    atorvastatin 40 mg oral tablet  -- 1 tab(s) by mouth once a day (at bedtime)  -- Indication: For CVA (cerebral vascular accident)    docusate sodium 100 mg oral capsule  -- 1 cap(s) by mouth 3 times a day  -- Indication: For Constipation    senna oral tablet  -- 2 tab(s) by mouth once a day (at bedtime), As needed, Constipation  -- Indication: For Constipation    sodium chloride 1 g oral tablet  -- 2 tab(s) by mouth every 8 hours  -- Indication: For Hyponatremia    Lutein 20 mg oral capsule  -- 1 cap(s) by mouth every other day (at bedtime)  -- Indication: For vitamin    Osteo Bi-Flex  -- 2 tabs by mouth daily  -- Indication: For vitamin    pantoprazole 40 mg oral delayed release tablet  -- 1 tab(s) by mouth once a day (before a meal)  -- Indication: For gi prophylaxis    Multi Vitamin+  -- 1 tab by mouth daily  -- Indication: For vitamin

## 2018-06-13 NOTE — DISCHARGE NOTE ADULT - PATIENT PORTAL LINK FT
You can access the [facility] Patient Portal, offered by [facility], by registering with the following website: [email or web address]

## 2018-06-13 NOTE — PROGRESS NOTE ADULT - PROVIDER SPECIALTY LIST ADULT
Cardiology
Cardiology
Internal Medicine
NSICU
Neurology
Neurosurgery

## 2018-06-13 NOTE — DISCHARGE NOTE ADULT - CARE PLAN
Principal Discharge DX:	Intracranial hemorrhage  Goal:	improve daily function  Assessment and plan of treatment:	-pt may restart aspirin 2 weeks after hemorrhage (6/22)  -pt should follow up with cardiologist  after rehab for ARIES. PFO was found on heart echo.  Pt was started on lipitor and metoprolol.  -pt needs to have repeat MRI brain in 1 month with and without contrast.          Inform the doctor immediately if you have a fever (above 101), chills, night  sweats, wound drainage, increasing wound redness or pain, nausea, vomiting, or  worsening headache.  B. ACTIVITY LEVEL  1. Fatigue is common following brain surgery, rest if you are tired.  2. You should get up and walk around every hour during the daytime.  -call MD if temp>101, seizure, worsening weakness in extremities or mental status change  - Drink plenty of water, stay out of the sun.   Avoid use of alcohol.

## 2018-06-13 NOTE — DISCHARGE NOTE ADULT - HOSPITAL COURSE
84M with h/o stroke (), seizures.  now presents with 2 episodes of seizures.  Brought to ED where CT showed L frontal ICH. On workup, no underlying mass. EEG negative. Pt continued his bong dose of keppra 1000mg in am and 1500 mg in pm.  Echo showed PFO EF 60-65%. Cardiology was consulted, started lipitor for CVA and lopresssor for Afib. Recommend outpatient ARIES. Pt also had MRI T/L spine showing compresssion fractures T6, T7, L4. No surgical intervention.  Pt also with hyponatremia during admission, corrected with salt tabs.  Pt needs repeat MRI Brain w/o contrast in 1 month   May restart baby Aspirin 2 weeks post hemorrhage ( on )      Neuroimagin/10 MR spine:  compression deformity L4, benign compression fracture related to osteopenia   MRI: early subacute L frontal hematoma with surrounding edema, consider amyloid angiopathy, no MR evidence of intracranial mass, SVID   CT head:  ICH L frontal, no MLS   CTA head:  markedly tortuous R and L ICA, no significant stenosis  Other imagin/08 CT chest / abd / pelvis:  abnormally dilated appendix ?mucocele, calcified aortic valve, pulmo edema, large and small airway disease / bronchiectasis; trace bilateral pleural effusion; compression fractures T6 T7 L4 vertebral bodies, central lucency in L4 ?pathologic fracture; radiation seed implants in prostate, 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  06/10 Doppler: NEG

## 2018-06-13 NOTE — DISCHARGE NOTE ADULT - NS AS DC STROKE ED MATERIALS
Risk Factors for Stroke/Prescribed Medications/Need for Followup After Discharge/Stroke Warning Signs and Symptoms/Stroke Education Booklet/Call 911 for Stroke

## 2018-06-18 DIAGNOSIS — I62.9 NONTRAUMATIC INTRACRANIAL HEMORRHAGE, UNSPECIFIED: ICD-10-CM

## 2018-06-18 DIAGNOSIS — R91.1 SOLITARY PULMONARY NODULE: ICD-10-CM

## 2018-06-18 DIAGNOSIS — I35.0 NONRHEUMATIC AORTIC (VALVE) STENOSIS: ICD-10-CM

## 2018-06-18 DIAGNOSIS — S32.049A UNSPECIFIED FRACTURE OF FOURTH LUMBAR VERTEBRA, INITIAL ENCOUNTER FOR CLOSED FRACTURE: ICD-10-CM

## 2018-06-18 DIAGNOSIS — I47.1 SUPRAVENTRICULAR TACHYCARDIA: ICD-10-CM

## 2018-06-18 DIAGNOSIS — S22.089A UNSPECIFIED FRACTURE OF T11-T12 VERTEBRA, INITIAL ENCOUNTER FOR CLOSED FRACTURE: ICD-10-CM

## 2018-06-18 DIAGNOSIS — I36.1 NONRHEUMATIC TRICUSPID (VALVE) INSUFFICIENCY: ICD-10-CM

## 2018-06-18 DIAGNOSIS — G93.6 CEREBRAL EDEMA: ICD-10-CM

## 2018-06-18 DIAGNOSIS — G93.41 METABOLIC ENCEPHALOPATHY: ICD-10-CM

## 2018-06-18 DIAGNOSIS — Q21.1 ATRIAL SEPTAL DEFECT: ICD-10-CM

## 2018-06-18 DIAGNOSIS — I34.0 NONRHEUMATIC MITRAL (VALVE) INSUFFICIENCY: ICD-10-CM

## 2018-06-18 DIAGNOSIS — M54.5 LOW BACK PAIN: ICD-10-CM

## 2018-06-18 DIAGNOSIS — J47.9 BRONCHIECTASIS, UNCOMPLICATED: ICD-10-CM

## 2018-06-18 DIAGNOSIS — E22.2 SYNDROME OF INAPPROPRIATE SECRETION OF ANTIDIURETIC HORMONE: ICD-10-CM
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
## 2018-06-18 DIAGNOSIS — I27.20 PULMONARY HYPERTENSION, UNSPECIFIED: ICD-10-CM

## 2018-06-18 DIAGNOSIS — Z79.82 LONG TERM (CURRENT) USE OF ASPIRIN: ICD-10-CM

## 2018-06-22 PROBLEM — Z00.00 ENCOUNTER FOR PREVENTIVE HEALTH EXAMINATION: Status: ACTIVE | Noted: 2018-06-22

## 2020-03-23 ENCOUNTER — PREPPED CHART (OUTPATIENT)
Dept: URBAN - METROPOLITAN AREA CLINIC 79 | Facility: CLINIC | Age: 85
End: 2020-03-23

## 2020-09-24 NOTE — PROGRESS NOTE ADULT - SUBJECTIVE AND OBJECTIVE BOX
=================================  NEUROCRITICAL CARE ATTENDING NOTE  =================================    THOMAS ROBB   MRN-[id number]  Summary:  84y/M  HPI:  84M hx HTN, BPH, stroke in 2013 with residual seizures, on ASA 81mg QD presents with witnessed seizures x2, CTH reveals left frontal IPH, no MLS. Per family, pt was on route home from dinner and had not yet taken his 10pm keppra. He had a witnessed seizure in the cab, was brought to ER for evaluation and keppra, no CTH performed at this time, then had repeat seizure episode after being discharged from ER. Pt remembers both episodes, denies LOC, tongue biting, but did experience incontinence. He reports mild left sided back pain currently, but no headache, blurry vision, dizziness, weakness or numbness. Family reports "whole body stiffened" and body/limbs were shaking. He is currently being followed by a neurologist in [place] who changed his keppra dose, but also has seen Dr. Levy in [facility]. ICH score 1, NIH score 0. (08 Jun 2018 04:27)      Overnight Events:  Denies HA/N/V/Dizziness.      PHYSICAL EXAMINATION  T(C): 37.8 (06-09 @ 09:00), Max: 37.8 (06-09 @ 09:00)  HR: 74 (06-09 @ 08:53) (64 - 94)  BP: 122/65 (06-09 @ 08:53) (116/68 - 150/77)  RR: 18 (06-09 @ 08:53) (14 - 19)  SpO2: 92% (06-09 @ 08:53) (91% - 99%)  CVP(mm Hg): --    LABS:  CAPILLARY BLOOD GLUCOSE                              11.3   11.5  )-----------( 194      ( 09 Jun 2018 06:12 )             31.7     06-09    129<L>  |  95<L>  |  13  ----------------------------<  111<H>  4.0   |  21<L>  |  0.71    Ca    8.6      09 Jun 2018 06:13  Phos  2.4     06-09  Mg     1.6     06-09    TPro  6.6  /  Alb  4.2  /  TBili  0.4  /  DBili  x   /  AST  29  /  ALT  26  /  AlkPhos  90  06-07 06-08 @ 07:01  -  06-09 @ 07:00  --------------------------------------------------------  IN: 2075 mL / OUT: 975 mL / NET: 1100 mL        MEDICATIONS:  MEDICATIONS  (STANDING):  docusate sodium 100 milliGRAM(s) Oral three times a day  levETIRAcetam 1000 milliGRAM(s) Oral <User Schedule>  levETIRAcetam 1500 milliGRAM(s) Oral <User Schedule>  lisinopril 20 milliGRAM(s) Oral daily  multivitamin 1 Tablet(s) Oral daily  pantoprazole  Injectable 40 milliGRAM(s) IV Push daily  simvastatin 20 milliGRAM(s) Oral at bedtime  sodium chloride 0.9%. 1000 milliLiter(s) (50 mL/Hr) IV Continuous <Continuous>  tamsulosin 0.4 milliGRAM(s) Oral at bedtime    MEDICATIONS  (PRN):  acetaminophen   Tablet 650 milliGRAM(s) Oral every 6 hours PRN For Temp greater than 38.5 C (101.3 F)  acetaminophen   Tablet. 650 milliGRAM(s) Oral every 6 hours PRN Mild Pain (1 - 3)  ondansetron Injectable 4 milliGRAM(s) IV Push every 6 hours PRN Nausea and/or Vomiting  oxyCODONE    5 mG/acetaminophen 325 mG 1 Tablet(s) Oral every 4 hours PRN Moderate Pain (4 - 6)  oxyCODONE    5 mG/acetaminophen 325 mG 2 Tablet(s) Oral every 4 hours PRN Severe Pain (7 - 10)  senna 2 Tablet(s) Oral at bedtime PRN Constipation =================================  NEUROCRITICAL CARE ATTENDING NOTE  =================================    THOMAS ROBB   MRN-[id number]  Summary:  84y/M  HPI:  84M hx HTN, BPH, stroke in 2013 with residual seizures, on ASA 81mg QD presents with witnessed seizures x2, CTH reveals left frontal IPH, no MLS. Per family, pt was on route home from dinner and had not yet taken his 10pm keppra. He had a witnessed seizure in the cab, was brought to ER for evaluation and keppra, no CTH performed at this time, then had repeat seizure episode after being discharged from ER. Pt remembers both episodes, denies LOC, tongue biting, but did experience incontinence. He reports mild left sided back pain currently, but no headache, blurry vision, dizziness, weakness or numbness. Family reports "whole body stiffened" and body/limbs were shaking. He is currently being followed by a neurologist in [place] who changed his keppra dose, but also has seen Dr. Levy in [facility]. ICH score 1, NIH score 0. (08 Jun 2018 04:27)      Overnight Events:  Denies HA/N/V/Dizziness.      PHYSICAL EXAMINATION  T(C): 37.8 (06-09 @ 09:00), Max: 37.8 (06-09 @ 09:00)  HR: 74 (06-09 @ 08:53) (64 - 94)  BP: 122/65 (06-09 @ 08:53) (116/68 - 150/77)  RR: 18 (06-09 @ 08:53) (14 - 19)  SpO2: 92% (06-09 @ 08:53) (91% - 99%)    LABS:  CAPILLARY BLOOD GLUCOSE               11.3   11.5  )-----------( 194      ( 09 Jun 2018 06:12 )             31.7     06-09    129<L>  |  95<L>  |  13  ----------------------------<  111<H>  4.0   |  21<L>  |  0.71    Ca    8.6      09 Jun 2018 06:13  Phos  2.4     06-09  Mg     1.6     06-09    TPro  6.6  /  Alb  4.2  /  TBili  0.4  /  DBili  x   /  AST  29  /  ALT  26  /  AlkPhos  90  06-07 06-08 @ 07:01  -  06-09 @ 07:00  --------------------------------------------------------  IN: 2075 mL / OUT: 975 mL / NET: 1100 mL      MEDICATIONS:  MEDICATIONS  (STANDING):  docusate sodium 100 milliGRAM(s) Oral three times a day  levETIRAcetam 1000 milliGRAM(s) Oral <User Schedule>  levETIRAcetam 1500 milliGRAM(s) Oral <User Schedule>  lisinopril 20 milliGRAM(s) Oral daily  multivitamin 1 Tablet(s) Oral daily  pantoprazole  Injectable 40 milliGRAM(s) IV Push daily  simvastatin 20 milliGRAM(s) Oral at bedtime  sodium chloride 0.9%. 1000 milliLiter(s) (50 mL/Hr) IV Continuous <Continuous>  tamsulosin 0.4 milliGRAM(s) Oral at bedtime    MEDICATIONS  (PRN):  acetaminophen   Tablet 650 milliGRAM(s) Oral every 6 hours PRN For Temp greater than 38.5 C (101.3 F)  acetaminophen   Tablet. 650 milliGRAM(s) Oral every 6 hours PRN Mild Pain (1 - 3)  ondansetron Injectable 4 milliGRAM(s) IV Push every 6 hours PRN Nausea and/or Vomiting  oxyCODONE    5 mG/acetaminophen 325 mG 1 Tablet(s) Oral every 4 hours PRN Moderate Pain (4 - 6)  oxyCODONE    5 mG/acetaminophen 325 mG 2 Tablet(s) Oral every 4 hours PRN Severe Pain (7 - 10)  senna 2 Tablet(s) Oral at bedtime PRN Constipation normal (ped)...

## 2021-02-24 NOTE — ED PROVIDER NOTE - PROGRESS NOTE DETAILS
From: Dajuan Ko  To: Quan Stiles  Sent: 2/24/2021 3:57 PM CST  Subject: Medication Question    Dr. Stiles,    The medication prescribed is not covered. They gave me one that is call Olmesartan/Medoxomil/Hydrochlorothiazide.    They are temporatory approving you first prescription and I am picking it up. But going forward you may need to rescript a new one to [facility]   pt improved vitals, ambulatory steadily w/o assistance, per wife, noted pt looks much better than prior.  pt unable to provide urinalysis, declines straight cath.  offered admission, pending blood cultures, declines, feels well, wishes to go home and follow up.  return precautions given.  pt and family appears reliable. initial abx given as part of sirs protocol.  however, no focal signs of infection noted after workup, though pt unable to provide UA, no signs of meningeal process.  no PO abx given but can follow up with PMD for UA and bd culture

## 2023-12-08 NOTE — ED PROVIDER NOTE - ENMT, MLM
Nissa,  See is the 10:30 R C/S on Monday 11th vs IOL TOLAC.  Mukesh
Airway patent, Nasal mucosa clear. Mouth with normal mucosa. Throat has no vesicles, no oropharyngeal exudates and uvula is midline.

## 2025-04-17 NOTE — DISCHARGE NOTE ADULT - MEDICATION SUMMARY - MEDICATIONS TO STOP TAKING
Ari RIDDLE JR. Barbara  4/17/2025 Late entry from 4/16/25  Ht Readings from Last 1 Encounters:   04/16/25 1.702 m (5' 7\")     Wt Readings from Last 10 Encounters:   04/16/25 79.7 kg (175 lb 12.8 oz)   04/02/25 78.4 kg (172 lb 14.4 oz)   03/19/25 80.6 kg (177 lb 9.6 oz)   03/05/25 78.7 kg (173 lb 6.4 oz)   02/19/25 79.2 kg (174 lb 8 oz)   01/29/25 80.6 kg (177 lb 12.8 oz)   01/15/25 79.2 kg (174 lb 9.6 oz)   12/11/24 79.2 kg (174 lb 11.2 oz)   11/27/24 79.8 kg (176 lb)   11/13/24 79.1 kg (174 lb 6.4 oz)     BMI=27.53    Assessment: Met with Ari while in for OTV with Dr. Kennedy for colon cancer. Day 1 cycle 5 Zirabev+irinotecan. Ari has been having diarrhea and Imodium has not been completely effective. Discussed appropriate foods for diarrhea and addition of soluble fiber to help absorb fluids to help form up stools. Provided written information including \"Diarrhea and Cancer\" and \"How to Alleviate Diarrhea\". Nutrition questions answered to apparent satisfaction. Ari was appreciative of information. Encouraged him to call if further questions arise. No significant weight change.     Sue Vaca RD LD   I will STOP taking the medications listed below when I get home from the hospital:    simvastatin 20 mg oral tablet  -- 1 tab(s) by mouth once a day (at bedtime)    Aspirin Enteric Coated 81 mg oral delayed release tablet  -- 1 tab(s) by mouth once a day